# Patient Record
Sex: FEMALE | Race: WHITE | Employment: OTHER | URBAN - METROPOLITAN AREA
[De-identification: names, ages, dates, MRNs, and addresses within clinical notes are randomized per-mention and may not be internally consistent; named-entity substitution may affect disease eponyms.]

---

## 2017-11-07 ENCOUNTER — HOSPITAL ENCOUNTER (OUTPATIENT)
Dept: WOMENS IMAGING | Age: 67
Discharge: HOME | End: 2017-11-07
Attending: GENERAL PRACTICE | Admitting: PHYSICIAN ASSISTANT

## 2017-11-07 DIAGNOSIS — Z78.0 POSTMENOPAUSAL: ICD-10-CM

## 2017-11-07 DIAGNOSIS — Z12.31 VISIT FOR SCREENING MAMMOGRAM: ICD-10-CM

## 2017-11-15 ENCOUNTER — HOSPITAL ENCOUNTER (OUTPATIENT)
Dept: ULTRASOUND IMAGING | Age: 67
Discharge: HOME | End: 2017-11-15
Attending: PHYSICIAN ASSISTANT | Admitting: PHYSICIAN ASSISTANT

## 2017-11-15 DIAGNOSIS — R92.8 ABNORMAL MAMMOGRAM: ICD-10-CM

## 2018-03-19 ENCOUNTER — HOSPITAL ENCOUNTER (OUTPATIENT)
Dept: GENERAL RADIOLOGY | Age: 68
Discharge: HOME | End: 2018-03-19
Attending: INTERNAL MEDICINE | Admitting: INTERNAL MEDICINE

## 2018-03-19 DIAGNOSIS — M25.512 CHRONIC LEFT SHOULDER PAIN: ICD-10-CM

## 2018-03-19 DIAGNOSIS — G89.29 CHRONIC LEFT SHOULDER PAIN: ICD-10-CM

## 2018-04-02 ENCOUNTER — HOSPITAL ENCOUNTER (OUTPATIENT)
Dept: PHYSICAL THERAPY | Age: 68
Discharge: HOME | End: 2018-04-30
Attending: INTERNAL MEDICINE | Admitting: INTERNAL MEDICINE

## 2018-04-02 ASSESSMENT — PAIN DESCRIPTION - ONSET: ONSET: GRADUAL

## 2018-04-02 ASSESSMENT — PAIN DESCRIPTION - LOCATION: LOCATION: SHOULDER

## 2018-04-02 ASSESSMENT — PAIN DESCRIPTION - ORIENTATION: ORIENTATION: LEFT;POSTERIOR

## 2018-04-02 ASSESSMENT — PAIN DESCRIPTION - PAIN TYPE: TYPE: CHRONIC PAIN

## 2018-04-02 ASSESSMENT — PAIN SCALES - GENERAL: PAINLEVEL_OUTOF10: 0

## 2018-04-02 ASSESSMENT — PAIN DESCRIPTION - DIRECTION: RADIATING_TOWARDS: DENIES

## 2018-04-02 ASSESSMENT — PAIN DESCRIPTION - FREQUENCY: FREQUENCY: INTERMITTENT

## 2018-04-02 ASSESSMENT — PAIN DESCRIPTION - PROGRESSION: CLINICAL_PROGRESSION: NOT CHANGED

## 2018-04-02 ASSESSMENT — PAIN DESCRIPTION - DESCRIPTORS: DESCRIPTORS: SHOOTING;ACHING

## 2018-04-06 ENCOUNTER — HOSPITAL ENCOUNTER (OUTPATIENT)
Dept: PHYSICAL THERAPY | Age: 68
Discharge: HOME | End: 2018-04-06
Admitting: INTERNAL MEDICINE

## 2018-04-09 ENCOUNTER — HOSPITAL ENCOUNTER (OUTPATIENT)
Dept: PHYSICAL THERAPY | Age: 68
Discharge: HOME | End: 2018-04-09
Admitting: INTERNAL MEDICINE

## 2018-04-12 ENCOUNTER — HOSPITAL ENCOUNTER (OUTPATIENT)
Dept: PHYSICAL THERAPY | Age: 68
Discharge: HOME | End: 2018-04-12
Admitting: INTERNAL MEDICINE

## 2018-04-16 ENCOUNTER — HOSPITAL ENCOUNTER (OUTPATIENT)
Dept: PHYSICAL THERAPY | Age: 68
Discharge: HOME | End: 2018-04-16
Admitting: INTERNAL MEDICINE

## 2018-04-19 ENCOUNTER — HOSPITAL ENCOUNTER (OUTPATIENT)
Dept: PHYSICAL THERAPY | Age: 68
Discharge: HOME | End: 2018-04-19
Admitting: INTERNAL MEDICINE

## 2018-04-23 ENCOUNTER — HOSPITAL ENCOUNTER (OUTPATIENT)
Dept: PHYSICAL THERAPY | Age: 68
Discharge: HOME | End: 2018-04-23
Admitting: INTERNAL MEDICINE

## 2018-04-30 ENCOUNTER — HOSPITAL ENCOUNTER (OUTPATIENT)
Dept: PHYSICAL THERAPY | Age: 68
Discharge: HOME | End: 2018-04-30
Admitting: INTERNAL MEDICINE

## 2018-05-01 ENCOUNTER — HOSPITAL ENCOUNTER (OUTPATIENT)
Dept: OTHER | Age: 68
Discharge: HOME | End: 2018-05-31
Attending: INTERNAL MEDICINE | Admitting: INTERNAL MEDICINE

## 2018-05-03 ENCOUNTER — HOSPITAL ENCOUNTER (OUTPATIENT)
Dept: PHYSICAL THERAPY | Age: 68
Discharge: HOME | End: 2018-05-03
Admitting: INTERNAL MEDICINE

## 2018-05-18 ENCOUNTER — HOSPITAL ENCOUNTER (OUTPATIENT)
Dept: MRI IMAGING | Age: 68
Discharge: HOME | End: 2018-05-18
Attending: INTERNAL MEDICINE | Admitting: INTERNAL MEDICINE

## 2018-05-18 DIAGNOSIS — M25.512 LEFT SHOULDER PAIN, UNSPECIFIED CHRONICITY: ICD-10-CM

## 2018-06-01 ENCOUNTER — HOSPITAL ENCOUNTER (OUTPATIENT)
Dept: OTHER | Age: 68
Discharge: HOME | End: 2018-06-13
Attending: INTERNAL MEDICINE | Admitting: INTERNAL MEDICINE

## 2018-06-13 ENCOUNTER — HOSPITAL ENCOUNTER (OUTPATIENT)
Dept: PHYSICAL THERAPY | Age: 68
Discharge: HOME | End: 2018-06-30
Attending: ORTHOPAEDIC SURGERY | Admitting: ORTHOPAEDIC SURGERY

## 2018-06-13 ASSESSMENT — PAIN DESCRIPTION - DESCRIPTORS: DESCRIPTORS: ACHING

## 2018-06-13 ASSESSMENT — PAIN DESCRIPTION - FREQUENCY: FREQUENCY: INTERMITTENT

## 2018-06-13 ASSESSMENT — ACTIVITIES OF DAILY LIVING (ADL): EFFECT OF PAIN ON DAILY ACTIVITIES: SEE ABOVE

## 2018-06-13 ASSESSMENT — PAIN DESCRIPTION - ONSET: ONSET: GRADUAL

## 2018-06-13 ASSESSMENT — PAIN DESCRIPTION - ORIENTATION: ORIENTATION: LEFT;POSTERIOR

## 2018-06-13 ASSESSMENT — PAIN SCALES - GENERAL: PAINLEVEL_OUTOF10: 5

## 2018-06-13 ASSESSMENT — PAIN DESCRIPTION - PAIN TYPE: TYPE: CHRONIC PAIN

## 2018-06-13 ASSESSMENT — PAIN DESCRIPTION - LOCATION: LOCATION: SHOULDER

## 2018-06-13 NOTE — FLOWSHEET NOTE
Physical Therapy Daily Treatment Note  Date:  2018    Patient Name:  Lora Briscoe    :  1950  MRN: [id number]    Restrictions:  No formal restrictions  Diagnosis: Chronic L Shoulder Pain, bursitis, impingement syndrome  Treatment Diagnosis: L UE pain, stiffness, weakness  PT Insurance Information: Medicare  -  G-Coded  Referring Practitioner: Francia Bales  POC Signed: pending  POC Date Range:  18 - 18  Progress Note Due:  Every 10th visit w/goals reviewed  Visit# / total visits:                    G-Code Selection: (On Eval and every 10th visit or Discharge)  Evaluation completed 18    MEASURE    [] Mobility: Walking and Moving Around     [] Current ()   [] Goal ()   [] DC ()  [] Changing/Maintaining Body Position     [] Current (8981)      [] Goal ()   [] DC ()  [x] Carrying / Moving / Handling Objects     [x] Current ()   [x] Goal ()   [] DC ()  [] Self-Care     [] Current ()   [] Goal ()   [] DC ()  [] Other PT/OT primary DX     [] Current ()   [] Goal ()   [] DC ()    SEVERITY    CURRENT  GOAL  DISCHARGE   [] CH (0% Impaired, Indep.)  [x] CI (1-19% Impaired, SBA-CGA)  [] CJ (20-39% Impaired, MIN A)  [] CK  (40-59% Impairment, Mod A)  [] CL  (60-79% Impairment, Max A)  [] CM  (80-99% Impairment, Dep.)   [] CN  (100% Impairment, Tot Dep.) [x] CH (0% Impaired, Indep.)  [] CI (1-19% Impaired, SBA-CGA)  [] CJ (20-39% Impaired, MIN A)  [] CK  (40-59% Impairment, Mod A)  [] CL  (60-79% Impairment, Max A)  [] CM  (80-99% Impairment, Dep.)   [] CN  (100% Impairment, Tot Dep.)  [] CH (0% Impaired, Indep.)  [] CI (1-19% Impaired, SBA-CGA)  [] CJ (20-39% Impaired, MIN A)  [] CK  (40-59% Impairment, Mod A)  [] CL  (60-79% Impairment, Max A)  [] CM  (80-99% Impairment, Dep.)   [] CN  (100% Impairment, Tot Dep.)            Initial Pain level: 5 /10 left shoulder     Subjective:  See initial evaluation.     Any Manual Therapy               [x] Self care home management                    (  x  ) Dry needling    Post Tx Pain Rating:    Did not rate    Plan:  (Fequency/duration/# of visits)   [x] Continue per plan of care [] Alter current plan   [x] Plan of care initiated [] Hold pending MD visit [] Discharge    Time In: 7345  Time Out:1020  Timed Code Treatment Minutes:  55  Total Treatment Minutes:  75    Electronically signed by:  Oma Garcia, ATTILA 6/13/2018, 11:13 AM

## 2018-06-13 NOTE — PROGRESS NOTES
Physical Therapy  Initial Assessment  Date: 2018  Patient Name: Antelmo Valero  MRN: [id number]  : 1950     Treatment Diagnosis: L UE pain, stiffness, weakness    Restrictions  Restrictions/Precautions  Restrictions/Precautions:  (No formal restrictions)    Subjective   General  Chart Reviewed: Yes  Patient assessed for rehabilitation services?: Yes  Additional Pertinent Hx: PMH:  Osteoporosis, hx lumbar spine injury (from youth) w/spondylolisthesis, kidney stones, heart cath d/t chest pain, breast biopsies  Family / Caregiver Present: No  Referring Practitioner: Katie Kirk  Diagnosis: Chronic L Shoulder Pain   Follows Commands: Within Functional Limits  PT Visit Information  Onset Date:  (2018; fell at the last two steps into a bush but not sure if that was mechanism of injury. )  PT Insurance Information: Medicare  Subjective  Subjective: Since  patient has had worsening left shoulder pain. Pt did report a fall in January going down the last two steps at her sister's house and fell onto left hip and into some bushes. Her whole left side (knee, hip, shoulder) were sore. Pt has had physical therapy w/some benefit and cortizone injection has provided relief. Pt is still having difficulty taking clothing off, adjusting seat belt, reaching behind her back.   MRI on 18 revealed left shoulder bursa tearing, injury/tendinopathy of supraspinatua/infraspinatus, labral  degeneration and AC joint degeneration  Pain Screening  Patient Currently in Pain: Yes  Pain Assessment  Pain Assessment: 0-10  Pain Level: 5 (only w/certain movements)  Pain Type: Chronic pain  Pain Location: Shoulder  Pain Orientation: Left;Posterior  Pain Radiating Towards: down into lateral deltoid and back  Pain Descriptors: Aching  Pain Frequency: Intermittent  Pain Onset: Gradual  Effect of Pain on Daily Activities: see above  Vital Signs  Patient Currently in Pain: Yes    Vision/Hearing  Vision  Vision: handling objects  Carrying, Moving and Handling Objects Current Status (): At least 1 percent but less than 20 percent impaired, limited or restricted  Carrying, Moving and Handling Objects Goal Status (): 0 percent impaired, limited or restricted    OutComes Score                                           Goals  Long term goals  Time Frame for Long term goals : In 6 weeks, by 7/25/18, patient will  Long term goal 1: demonstrate compliance and independence w/HEP  Long term goal 2: demonstate full AROM shoulder ROM as needed for adls/leisure/homemaking activities. Long term goal 3: score 0% disability on Quick DASH as indication of significant functional improvement. Long term goal 4: demonstrate >= 4+/5 LUE strength for improved reaching, lifting function.        Therapy Time   Individual Concurrent Group Co-treatment   Time In 0905         Time Out 1020         Minutes 75         Timed Code Treatment Minutes: [place] [address], PT

## 2018-06-25 ENCOUNTER — HOSPITAL ENCOUNTER (OUTPATIENT)
Dept: PHYSICAL THERAPY | Age: 68
Discharge: HOME | End: 2018-06-25
Admitting: ORTHOPAEDIC SURGERY

## 2018-06-27 ENCOUNTER — HOSPITAL ENCOUNTER (OUTPATIENT)
Dept: PHYSICAL THERAPY | Age: 68
Discharge: HOME | End: 2018-06-27
Admitting: ORTHOPAEDIC SURGERY

## 2018-07-01 ENCOUNTER — HOSPITAL ENCOUNTER (OUTPATIENT)
Dept: OTHER | Age: 68
Discharge: HOME | End: 2018-07-31
Attending: ORTHOPAEDIC SURGERY | Admitting: ORTHOPAEDIC SURGERY

## 2018-07-10 ENCOUNTER — HOSPITAL ENCOUNTER (OUTPATIENT)
Dept: PHYSICAL THERAPY | Age: 68
Discharge: HOME | End: 2018-07-10
Admitting: ORTHOPAEDIC SURGERY

## 2018-07-10 NOTE — FLOWSHEET NOTE
functional outcome. Objective   findings: See initial eval AROM Supine IR 78°  ER 75°  abd 123°  Flex 156° AROM Supine IR 68° (shoulder abd 80 deg)  ER 78° (shoulder abd 80 deg)  abd 128°  Flex 164* AAROM  Left shoulder abd in sitting = 120 deg    TTP left supraspinatus, infraspinatus insertions, UT, teres minor   aarom  Supine scaption 140 deg w/pain EOR    Sitting FF  arom  148 deg    TTP left supraspinatus, infraspinatus insertions, UT, teres minor    Resistance felt left shoulder w/PROM shoulder ROM; not resisting/no pain. Response to intervention: Debra well. No increased pain Increase in ROM No increased pain  No increased pain   Overall change since Evaluation: N/A Reports improvement Continued improved ROM; pain remains at [facility] joint w/overhead movements Continued improved ROM; pain remains at [facility] joint w/overhead movements Slight improvement w/reaching overhead and reaching behind back   Plan for Next Session: Monitor response to DNT.   PREs left shoulder  Joint mobs scap mobs  GH mobs  biodex UBE  PREs w/focus on ER left shoulder   avoid impingement scap mobs  GH mobs  biodex UBE  PREs w/focus on ER left shoulder   avoid impingement scap mobs  GH mobs  biodex UBE  PREs w/focus on ER left shoulder   avoid impingement scap mobs  GH mobs  biodex UBE  PREs w/focus on ER left shoulder   avoid impingement     Intervention/modality used today:  [x] Therapeutic Exercise  [] Modalities:  [] Therapeutic Activity     [] Ultrasound  [] Elec  Stim  [] Gait Training      [] Cervical Traction [] Lumbar Traction  [x] Neuromuscular Re-education    [] Cold/hotpack [] Iontophoresis   [] Instruction in HEP      [] Vasopneumatic     [x] Manual Therapy               [] Self care home management                    (  ) Dry needling    Post Tx Pain Rating:   No increased pain    Plan:  (Fequency/duration/# of visits)   [x] Continue per plan of care [] Alter current plan   [] Plan of care initiated [] Hold pending MD visit [] Discharge    Time In: 1302   Time Out: 1313  Time In:  1314  Time Out:  1349  Timed Code Treatment Minutes: 11 + 35  Total Treatment Minutes: 46    Electronically signed by:  Autumn Molina, PTA 7/10/2018, 1:02 PM   Shelton Urias, ATTILA 7/10/18

## 2018-07-23 ENCOUNTER — HOSPITAL ENCOUNTER (OUTPATIENT)
Dept: PHYSICAL THERAPY | Age: 68
Discharge: HOME | End: 2018-07-23
Admitting: ORTHOPAEDIC SURGERY

## 2018-07-23 NOTE — FLOWSHEET NOTE
Physical Therapy Daily Treatment Note  Date:  2018    Patient Name:  Lev Sewell    :  1950  MRN: [id number]    Restrictions:  No formal restrictions  Diagnosis: Chronic L Shoulder Pain, bursitis, impingement syndrome  Treatment Diagnosis: L UE pain, stiffness, weakness  PT Insurance Information: Medicare  -  G-Coded  Referring Practitioner: Corie Alcantara  POC Signed: signed  POC Date Range:  18 - 18  Progress Note Due                    G-Code Selection: (On Eval and every 10th visit or Discharge)  Evaluation completed 18    MEASURE    [] Mobility: Walking and Moving Around     [] Current ()   [] Goal ()   [] DC ()  [] Changing/Maintaining Body Position     [] Current (8981)      [] Goal ()   [] DC ()  [x] Carrying / Moving / Handling Objects     [x] Current ()   [x] Goal ()   [] DC ()  [] Self-Care     [] Current ()   [] Goal ()   [] DC ()  [] Other PT/OT primary DX     [] Current ()   [] Goal ()   [] DC ()    SEVERITY    CURRENT  GOAL  DISCHARGE   [] CH (0% Impaired, Indep.)  [x] CI (1-19% Impaired, SBA-CGA)  [] CJ (20-39% Impaired, MIN A)  [] CK  (40-59% Impairment, Mod A)  [] CL  (60-79% Impairment, Max A)  [] CM  (80-99% Impairment, Dep.)   [] CN  (100% Impairment, Tot Dep.) [x] CH (0% Impaired, Indep.)  [] CI (1-19% Impaired, SBA-CGA)  [] CJ (20-39% Impaired, MIN A)  [] CK  (40-59% Impairment, Mod A)  [] CL  (60-79% Impairment, Max A)  [] CM  (80-99% Impairment, Dep.)   [] CN  (100% Impairment, Tot Dep.)  [] CH (0% Impaired, Indep.)  [] CI (1-19% Impaired, SBA-CGA)  [] CJ (20-39% Impaired, MIN A)  [] CK  (40-59% Impairment, Mod A)  [] CL  (60-79% Impairment, Max A)  [] CM  (80-99% Impairment, Dep.)   [] CN  (100% Impairment, Tot Dep.)      2018 MRI left shoulder:  1.  Less than 50% partial-thickness bursal surface tearing of the anterior and   mid insertional fibers of supraspinatus near the Re-education:  VC/TC/Demo for proper technique and TC for muscle recruitment given to ensure maximum therapeutic benefit and functional outcome. Neuromuscular Re-education:  VC/TC/Demo for proper technique and TC for muscle recruitment given to ensure maximum therapeutic benefit and functional outcome. Neuromuscular Re-education:  VC/TC/Demo for proper technique and TC for muscle recruitment given to ensure maximum therapeutic benefit and functional outcome. Verbal and manual cueing on proper performance of the prescribed exercise or of the designated task   Objective   findings: See initial eval AROM Supine IR 78°  ER 75°  abd 123°  Flex 156° AROM Supine IR 68° (shoulder abd 80 deg)  ER 78° (shoulder abd 80 deg)  abd 128°  Flex 164* AAROM  Left shoulder abd in sitting = 120 deg    TTP left supraspinatus, infraspinatus insertions, UT, teres minor   aarom  Supine scaption 140 deg w/pain EOR    Sitting FF  arom  148 deg    TTP left supraspinatus, infraspinatus insertions, UT, teres minor    Resistance felt left shoulder w/PROM shoulder ROM; not resisting/no pain. Supine flex 173*   Response to intervention: Debra well. No increased pain Increase in ROM No increased pain  No increased pain No increase in pain with exs   Overall change since Evaluation: N/A Reports improvement Continued improved ROM; pain remains at [facility] joint w/overhead movements Continued improved ROM; pain remains at [facility] joint w/overhead movements Slight improvement w/reaching overhead and reaching behind back Improved ROM and ability to reach seatblet   Plan for Next Session: Monitor response to DNT.   PREs left shoulder  Joint mobs scap mobs  GH mobs  biodex UBE  PREs w/focus on ER left shoulder   avoid impingement scap mobs  GH mobs  biodex UBE  PREs w/focus on ER left shoulder   avoid impingement scap mobs  GH mobs  biodex UBE  PREs w/focus on ER left shoulder   avoid impingement scap mobs  GH mobs  biodex UBE  PREs w/focus on ER left shoulder

## 2018-07-25 ENCOUNTER — HOSPITAL ENCOUNTER (OUTPATIENT)
Dept: PHYSICAL THERAPY | Age: 68
Discharge: HOME | End: 2018-07-25
Admitting: ORTHOPAEDIC SURGERY

## 2018-07-25 NOTE — FLOWSHEET NOTE
Physical Therapy Daily Treatment Note  Date:  2018    Patient Name:  Cindy Hurt    :  1950  MRN: [id number]    Restrictions:  No formal restrictions  Diagnosis: Chronic L Shoulder Pain, bursitis, impingement syndrome  Treatment Diagnosis: L UE pain, stiffness, weakness  PT Insurance Information: Medicare  -  G-Coded  G-Coded on 18  Referring Practitioner: Althea Kenney  POC Signed: signed  POC Date Range:  18 - 18  Progress Note Due 10 / 12                   G-Code Selection: (On Eval and every 10th visit or Discharge)  Evaluation completed 18    MEASURE    [] Mobility: Walking and Moving Around     [] Current ()   [] Goal ()   [] DC ()  [] Changing/Maintaining Body Position     [] Current (8981)      [] Goal ()   [] DC ()  [x] Carrying / Moving / Handling Objects     [x] Current ()   [x] Goal ()   [] DC ()  [] Self-Care     [] Current ()   [] Goal ()   [] DC ()  [] Other PT/OT primary DX     [] Current ()   [] Goal ()   [] DC ()    SEVERITY    CURRENT  GOAL  DISCHARGE   [] CH (0% Impaired, Indep.)  [x] CI (1-19% Impaired, SBA-CGA)  [] CJ (20-39% Impaired, MIN A)  [] CK  (40-59% Impairment, Mod A)  [] CL  (60-79% Impairment, Max A)  [] CM  (80-99% Impairment, Dep.)   [] CN  (100% Impairment, Tot Dep.) [x] CH (0% Impaired, Indep.)  [] CI (1-19% Impaired, SBA-CGA)  [] CJ (20-39% Impaired, MIN A)  [] CK  (40-59% Impairment, Mod A)  [] CL  (60-79% Impairment, Max A)  [] CM  (80-99% Impairment, Dep.)   [] CN  (100% Impairment, Tot Dep.)  [] CH (0% Impaired, Indep.)  [] CI (1-19% Impaired, SBA-CGA)  [] CJ (20-39% Impaired, MIN A)  [] CK  (40-59% Impairment, Mod A)  [] CL  (60-79% Impairment, Max A)  [] CM  (80-99% Impairment, Dep.)   [] CN  (100% Impairment, Tot Dep.)      2018 MRI left shoulder:  1.  Less than 50% partial-thickness bursal surface tearing of the anterior and   mid insertional fibers of supraspinatus near the footplate.  Moderate   underlying supraspinatus tendinopathy. 2. Mild infraspinatus tendinopathy. 3. Mild diffuse labral degeneration.  Normal variant sublabral sulcus. 4. Mild degenerative changes of the left AC joint. Initial Pain level: 0/10 actively using it, 0/10 at rest, worst 4/10 (avoids having it get too bad)     Subjective:  Easier to take shirt off. Still painful w/reaching LUE back behind her (such as to don seatbelt), she is now able to do that slowly. Pt saw an orthopedic surgeon last Thursday. She saw a female PA who said for her to finish out her PT. Said \". .at her age. [facility] \" she may never get back full range. Any changes to Ambulatory Summary Sheet? No  Any major status changes? No             Goals:     Long term goals  Time Frame for Long term goals : In 6 weeks, by 7/25/18, patient will  Long term goal 1: demonstrate compliance and independence w/HEP  Long term goal 2: demonstate full AROM shoulder ROM as needed for adls/leisure/homemaking activities. Long term goal 3: score 0% disability on Quick DASH as indication of significant functional improvement. Long term goal 4: demonstrate >= 4+/5 LUE strength for improved reaching, lifting function. Skilled PT activities: Date 6/13/18  #1 Date 6/27/18   #5 Date 7/3/18  #6 Date 7/5/18  #7 7/10/18 #8 7/23/18 #9 7/25/18 #10   Outcome measure  QD = 17      QD=16    Home Management/Self-Care  DNT Acknowledgement form provided, reviewed and signed by patient. Patient wished to proceed w/DNT left shoulder. Pt informed to expect mild needle soreness x 1-2 days.                    Therex   Reviewed the following exercises:    Scapular retraction w/GTB    IR/ER  RTB    Standing  Shoulder scaption w/RTB    Shoulder abd w/wall walk using RTB    Shoulder ER w/shoulder abd using RTB    (above exercises provided by previous therapist and still remain appropriate.) UBE 3'/3'    cybex RD 15# 2 x 10    Side lye ER 1# 2 x 10 L only    Seated shldr abd 1# 2 x 10 B    Seated scaption  1# 2 x 10 B UBE 3'/3' w/shoulder flex approx 60 deg    cybex RD/ROWS 15#  Seat #1 2 x 10    Side lye ER  Towel under arm  1.5# 2 x 10 L only    Seated on lg yellow SB  shldr abd 1.5# 2 x 10 B    Seated on lg yellow SB scaption  1.5# 2 x 10 B cybex   Seat #1, arms #2  RD 10#  2 x 10  ROWS 15#  Seat #1 2 x 15    Seated on lg yellow SB  D2 flexion/ext  LUE AAROM  2 x 10    Seated on lg yellow SB  shldr abd 1.5# 2 x 10 B    Seated on lg yellow SB scaption  1.5# 2 x 10 B   UBE 4'/4'    LUE ER full arom  2# hand wt  Right side lying  3 x 10    Seated on lg yellow SB  D2 flexion/ext  LUE Arom  1.5# wrist wt  2 x 10    Seated on lg yellow SB  shldr abd 1.5# 2 x 10 B    Seated on lg yellow SB scaption  1.5# 2 x 10 B     UBE 4'/4'    LUE ER full arom  2# hand wt  Right side lying  3 x 10    Seated on lg yellow SB  D2 flexion/ext  LUE Arom  1.5# wrist wt  3 x 10    Seated on lg yellow SB  shldr abd 1.5# 2 x 10 B    Seated on lg yellow SB scaption  1.5# 3 x 10 B UBE 4'/4'    LUE ER full AROM 2# hand wt R sidelying 3 x 10 reps    Seated on lg yellow SB D2 flex/ext L UE with 1.5# wrist wt 3 x 10 reps    Seated on lg yellow SB shldr abd 1.5# 2 x 10    Seated on lg yellow SB with 1.5# scaption 3 x 10 reps B    Cybex rows and RD 15# 2 x 10 reps       Manual therapy    Joint mobs , distractions and PROM in all planes x 15 min Joint mobs , distractions and PROM in all planes x 15 min Joint mobs , distractions and PROM in all planes   Subscapularis stretch  x 15 min Joint mobs , distractions and PROM in all planes   Subscapularis stretch  x 15 min Joint mobs , distractions and PROM in all planes   Subscapularis stretch  x 15 min Joint mobs , distractions and PROM in all planes   Subscapularis stretch  x 15 min   DNT   Right sidelying w/pillow b/t knees    Left supraspinatus/infraspinatus and teres minor  0.25 x 30 mm   Right side lying w/pillow b/t knees    Left supraspinatus/infraspinatus, teres minor, upper trap  0.25 x 30 mm                                                                                                                     HEP: See above cont Continue cane and T-band exercises continue Continue HEP cont Cont, blue TB and demo for rows   Supervision/Cues:  See above Verbal and manual cueing on proper performance of the prescribed exercise or of the designated task Neuromuscular Re-education:  VC/TC/Demo for proper technique and TC for muscle recruitment given to ensure maximum therapeutic benefit and functional outcome. Neuromuscular Re-education:  VC/TC/Demo for proper technique and TC for muscle recruitment given to ensure maximum therapeutic benefit and functional outcome. Neuromuscular Re-education:  VC/TC/Demo for proper technique and TC for muscle recruitment given to ensure maximum therapeutic benefit and functional outcome. Verbal and manual cueing on proper performance of the prescribed exercise or of the designated task Verbal and manual cueing on proper performance of the prescribed exercise or of the designated task   Objective   findings: See initial eval AROM Supine IR 78°  ER 75°  abd 123°  Flex 156° AROM Supine IR 68° (shoulder abd 80 deg)  ER 78° (shoulder abd 80 deg)  abd 128°  Flex 164* AAROM  Left shoulder abd in sitting = 120 deg    TTP left supraspinatus, infraspinatus insertions, UT, teres minor   aarom  Supine scaption 140 deg w/pain EOR    Sitting FF  arom  148 deg    TTP left supraspinatus, infraspinatus insertions, UT, teres minor    Resistance felt left shoulder w/PROM shoulder ROM; not resisting/no pain. Supine flex 173* AROM  Flex sup 164*  Ext standing 38*  abd sup 174*  IR sup 70*  ER sup 74*   Response to intervention: Debra well.   No increased pain Increase in ROM No increased pain  No increased pain No increase in pain with exs No increase in pain   Overall change since Evaluation: N/A Reports improvement Continued

## 2018-07-25 NOTE — PLAN OF CARE
Outpatient Physical Therapy        [] Phone: [phone]   Fax: [phone]   Physician: Referring Practitioner: Francia Bales        From: Yusuf Arenas PT     Patient: Lora Briscoe                    : 1950  Diagnosis: Chronic L Shoulder Pain, impingement syndrome, bursitis left shoulder  Treatment Diagnosis: L UE pain, stiffness, weakness  Date: 2018    [x]  Plan of Care    Total Visits to date:  10     Cancels/No-shows to date:  0    Subjective: Pt reports that the shoulder is doing much better, not all the way back to the way it was prior to the onset of pain, but much better. Taking the shirt off is much easier. It is still painful with reaching L UE back behind her (such as to don seatbelt) - now able to do that slowly. Pt saw a PA at an orthopedic office last Thursday - said for her to finish out her PT, said \". ..at her age. [employer] \" she may never get back full range. Prominent Objective Findings:      QuickDASH: 11% disability    L Shoulder AROM: supine:   Flexion: 164 deg   ABD: 174 deg   IR: 70 deg   ER: 74 deg  L shoulder AROM: standing:   Extension: 38 deg     Assessment: Ms. Denilson Lai indicates and demonstrates progress towards goals since starting therapy. Pain, ROM, and function have improved, but max improvement has not yet been achieved. She is motivated to continue therapy through the 12 visits established in the original plan of care, and then wishes to transition to the [facility] thereafter.      Goal Status:  [] Achieved [x] Partially Achieved  [] Not Achieved     Planned Services:  [x] Therapeutic Exercise    [] Modalities:  [x] Therapeutic Activity     [x] Ultrasound  [x] Electric Stimulation  [] Gait Training      [] Cervical Traction    [] Lumbar Traction  [x] Neuromuscular Re-education  [x] Cold/hotpack [] Iontophoresis  [x] Instruction in HEP      Other:  [x] Manual Therapy       [x]  Vasopneumatic  [x] Self care management                           [x] Dry needling trigger

## 2018-08-01 ENCOUNTER — HOSPITAL ENCOUNTER (OUTPATIENT)
Dept: OTHER | Age: 68
Discharge: HOME | End: 2018-08-03
Attending: ORTHOPAEDIC SURGERY | Admitting: ORTHOPAEDIC SURGERY

## 2019-09-09 ENCOUNTER — APPOINTMENT (OUTPATIENT)
Dept: CT IMAGING | Age: 69
End: 2019-09-09
Payer: MEDICARE

## 2019-09-09 ENCOUNTER — HOSPITAL ENCOUNTER (EMERGENCY)
Age: 69
Discharge: HOME | End: 2019-09-09
Attending: EMERGENCY MEDICINE
Payer: MEDICARE

## 2019-09-09 VITALS
WEIGHT: 135 LBS | RESPIRATION RATE: 22 BRPM | HEIGHT: 67 IN | BODY MASS INDEX: 21.19 KG/M2 | HEART RATE: 75 BPM | OXYGEN SATURATION: 94 % | DIASTOLIC BLOOD PRESSURE: 86 MMHG | TEMPERATURE: 97.8 F | SYSTOLIC BLOOD PRESSURE: 175 MMHG

## 2019-09-09 DIAGNOSIS — R31.9 URINARY TRACT INFECTION WITH HEMATURIA, SITE UNSPECIFIED: ICD-10-CM

## 2019-09-09 DIAGNOSIS — N39.0 URINARY TRACT INFECTION WITH HEMATURIA, SITE UNSPECIFIED: ICD-10-CM

## 2019-09-09 DIAGNOSIS — N20.0 KIDNEY STONE: Primary | ICD-10-CM

## 2019-09-09 LAB
ALBUMIN SERPL-MCNC: 4.2 GM/DL (ref 3.4–5)
ALP BLD-CCNC: 62 IU/L (ref 40–128)
ALT SERPL-CCNC: 9 U/L (ref 10–40)
ANION GAP SERPL CALCULATED.3IONS-SCNC: 14 MMOL/L (ref 4–16)
AST SERPL-CCNC: 13 IU/L (ref 15–37)
BACTERIA: ABNORMAL /HPF
BASOPHILS ABSOLUTE: 0.1 K/CU MM
BASOPHILS RELATIVE PERCENT: 0.7 % (ref 0–1)
BILIRUB SERPL-MCNC: 0.2 MG/DL (ref 0–1)
BILIRUBIN URINE: NEGATIVE MG/DL
BLOOD, URINE: ABNORMAL
BUN BLDV-MCNC: 26 MG/DL (ref 6–23)
CALCIUM SERPL-MCNC: 9.3 MG/DL (ref 8.3–10.6)
CHLORIDE BLD-SCNC: 104 MMOL/L (ref 99–110)
CLARITY: ABNORMAL
CO2: 24 MMOL/L (ref 21–32)
COLOR: YELLOW
CREAT SERPL-MCNC: 0.7 MG/DL (ref 0.6–1.1)
DIFFERENTIAL TYPE: ABNORMAL
EOSINOPHILS ABSOLUTE: 0.2 K/CU MM
EOSINOPHILS RELATIVE PERCENT: 2 % (ref 0–3)
GFR AFRICAN AMERICAN: >60 ML/MIN/1.73M2
GFR NON-AFRICAN AMERICAN: >60 ML/MIN/1.73M2
GLUCOSE BLD-MCNC: 147 MG/DL (ref 70–99)
GLUCOSE, URINE: NEGATIVE MG/DL
HCT VFR BLD CALC: 41 % (ref 37–47)
HEMOGLOBIN: 13 GM/DL (ref 12.5–16)
IMMATURE NEUTROPHIL %: 0.3 % (ref 0–0.43)
KETONES, URINE: NEGATIVE MG/DL
LEUKOCYTE ESTERASE, URINE: ABNORMAL
LIPASE: 28 IU/L (ref 13–60)
LYMPHOCYTES ABSOLUTE: 2.2 K/CU MM
LYMPHOCYTES RELATIVE PERCENT: 19.5 % (ref 24–44)
MCH RBC QN AUTO: 29.7 PG (ref 27–31)
MCHC RBC AUTO-ENTMCNC: 31.7 % (ref 32–36)
MCV RBC AUTO: 93.8 FL (ref 78–100)
MONOCYTES ABSOLUTE: 0.6 K/CU MM
MONOCYTES RELATIVE PERCENT: 5.2 % (ref 0–4)
MUCUS: ABNORMAL HPF
NITRITE URINE, QUANTITATIVE: NEGATIVE
NON SQUAM EPI CELLS: 2 /HPF
NUCLEATED RBC %: 0 %
PDW BLD-RTO: 12.8 % (ref 11.7–14.9)
PH, URINE: 5 (ref 5–8)
PLATELET # BLD: 299 K/CU MM (ref 140–440)
PMV BLD AUTO: 10 FL (ref 7.5–11.1)
POTASSIUM SERPL-SCNC: 4 MMOL/L (ref 3.5–5.1)
PROTEIN UA: NEGATIVE MG/DL
RBC # BLD: 4.37 M/CU MM (ref 4.2–5.4)
RBC URINE: 171 /HPF (ref 0–6)
SEGMENTED NEUTROPHILS ABSOLUTE COUNT: 8.3 K/CU MM
SEGMENTED NEUTROPHILS RELATIVE PERCENT: 72.3 % (ref 36–66)
SODIUM BLD-SCNC: 142 MMOL/L (ref 135–145)
SPECIFIC GRAVITY UA: 1.01 (ref 1–1.03)
SQUAMOUS EPITHELIAL: 10 /HPF
TOTAL IMMATURE NEUTOROPHIL: 0.04 K/CU MM
TOTAL NUCLEATED RBC: 0 K/CU MM
TOTAL PROTEIN: 7.3 GM/DL (ref 6.4–8.2)
TRICHOMONAS: ABNORMAL /HPF
UROBILINOGEN, URINE: NORMAL MG/DL (ref 0.2–1)
WBC # BLD: 11.5 K/CU MM (ref 4–10.5)
WBC UA: 150 /HPF (ref 0–5)

## 2019-09-09 PROCEDURE — 96375 TX/PRO/DX INJ NEW DRUG ADDON: CPT

## 2019-09-09 PROCEDURE — 80053 COMPREHEN METABOLIC PANEL: CPT

## 2019-09-09 PROCEDURE — 96365 THER/PROPH/DIAG IV INF INIT: CPT

## 2019-09-09 PROCEDURE — 6360000002 HC RX W HCPCS: Performed by: EMERGENCY MEDICINE

## 2019-09-09 PROCEDURE — 87086 URINE CULTURE/COLONY COUNT: CPT

## 2019-09-09 PROCEDURE — 74176 CT ABD & PELVIS W/O CONTRAST: CPT

## 2019-09-09 PROCEDURE — 85025 COMPLETE CBC W/AUTO DIFF WBC: CPT

## 2019-09-09 PROCEDURE — 81001 URINALYSIS AUTO W/SCOPE: CPT

## 2019-09-09 PROCEDURE — 2580000003 HC RX 258: Performed by: EMERGENCY MEDICINE

## 2019-09-09 PROCEDURE — 83690 ASSAY OF LIPASE: CPT

## 2019-09-09 PROCEDURE — 99284 EMERGENCY DEPT VISIT MOD MDM: CPT

## 2019-09-09 RX ORDER — KETOROLAC TROMETHAMINE 30 MG/ML
15 INJECTION, SOLUTION INTRAMUSCULAR; INTRAVENOUS ONCE
Status: COMPLETED | OUTPATIENT
Start: 2019-09-09 | End: 2019-09-09

## 2019-09-09 RX ORDER — IBUPROFEN 600 MG/1
600 TABLET ORAL EVERY 8 HOURS PRN
Qty: 30 TABLET | Refills: 0 | Status: SHIPPED | OUTPATIENT
Start: 2019-09-09

## 2019-09-09 RX ORDER — MORPHINE SULFATE 4 MG/ML
4 INJECTION, SOLUTION INTRAMUSCULAR; INTRAVENOUS ONCE
Status: COMPLETED | OUTPATIENT
Start: 2019-09-09 | End: 2019-09-09

## 2019-09-09 RX ORDER — ONDANSETRON 2 MG/ML
4 INJECTION INTRAMUSCULAR; INTRAVENOUS ONCE
Status: COMPLETED | OUTPATIENT
Start: 2019-09-09 | End: 2019-09-09

## 2019-09-09 RX ORDER — TAMSULOSIN HYDROCHLORIDE 0.4 MG/1
0.4 CAPSULE ORAL DAILY
Qty: 10 CAPSULE | Refills: 0 | Status: SHIPPED | OUTPATIENT
Start: 2019-09-09 | End: 2022-03-09

## 2019-09-09 RX ORDER — 0.9 % SODIUM CHLORIDE 0.9 %
1000 INTRAVENOUS SOLUTION INTRAVENOUS ONCE
Status: COMPLETED | OUTPATIENT
Start: 2019-09-09 | End: 2019-09-09

## 2019-09-09 RX ORDER — CEFDINIR 300 MG/1
300 CAPSULE ORAL 2 TIMES DAILY
Qty: 20 CAPSULE | Refills: 0 | Status: SHIPPED | OUTPATIENT
Start: 2019-09-09 | End: 2019-09-19

## 2019-09-09 RX ORDER — HYDROCODONE BITARTRATE AND ACETAMINOPHEN 5; 325 MG/1; MG/1
1 TABLET ORAL EVERY 6 HOURS PRN
Qty: 10 TABLET | Refills: 0 | Status: SHIPPED | OUTPATIENT
Start: 2019-09-09 | End: 2019-09-12

## 2019-09-09 RX ADMIN — KETOROLAC TROMETHAMINE 15 MG: 30 INJECTION, SOLUTION INTRAMUSCULAR at 02:45

## 2019-09-09 RX ADMIN — ONDANSETRON 4 MG: 2 INJECTION INTRAMUSCULAR; INTRAVENOUS at 01:55

## 2019-09-09 RX ADMIN — CEFTRIAXONE SODIUM 1 G: 1 INJECTION, POWDER, FOR SOLUTION INTRAMUSCULAR; INTRAVENOUS at 02:03

## 2019-09-09 RX ADMIN — SODIUM CHLORIDE 1000 ML: 9 INJECTION, SOLUTION INTRAVENOUS at 02:02

## 2019-09-09 RX ADMIN — MORPHINE SULFATE 4 MG: 4 INJECTION, SOLUTION INTRAMUSCULAR; INTRAVENOUS at 01:55

## 2019-09-09 ASSESSMENT — PAIN SCALES - GENERAL
PAINLEVEL_OUTOF10: 10
PAINLEVEL_OUTOF10: 0

## 2019-09-09 ASSESSMENT — ENCOUNTER SYMPTOMS
SORE THROAT: 0
ABDOMINAL PAIN: 0
COLOR CHANGE: 0
COUGH: 0
SHORTNESS OF BREATH: 0

## 2019-09-09 ASSESSMENT — PAIN DESCRIPTION - PAIN TYPE: TYPE: ACUTE PAIN

## 2019-09-09 ASSESSMENT — PAIN DESCRIPTION - LOCATION: LOCATION: FLANK

## 2019-09-09 ASSESSMENT — PAIN DESCRIPTION - ORIENTATION: ORIENTATION: RIGHT

## 2019-09-10 LAB
CULTURE: ABNORMAL
Lab: ABNORMAL
SPECIMEN: ABNORMAL
TOTAL COLONY COUNT: ABNORMAL

## 2019-09-10 NOTE — ED PROVIDER NOTES
Dr Stephanie Porras pt    Pt called in to update  Dr Stephanie Porras that he is still experiencing belching when he lays down at night and the diarrhea is stable with the medication    [phone] reconstruction, and/or weight based adjustment of the mA/kV was utilized to reduce the radiation dose to as low as reasonably achievable. COMPARISON: 08/13/2015 CT HISTORY: ORDERING SYSTEM PROVIDED HISTORY: right flank pain, hematuria, hx of kidney stones TECHNOLOGIST PROVIDED HISTORY: Additional Contrast?->None Reason for Exam: abd pain Acuity: Acute Type of Exam: Initial Additional signs and symptoms: Patient to ED with complaints of right sided flank pain that began yesterday. Hx of kidney stones. Vomiting. Alert and oriented during triage. Relevant Medical/Surgical History: abd pain FINDINGS: Lower Chest:  Mild interstitial changes in the lung bases. The base of the heart is normal. Organs: 11 mm cyst in the right hepatic lobe. Gallbladder, biliary ducts, pancreas and spleen are normal.  The adrenal glands are normal.  Punctate 2 mm calculus in the proximal left ureter with mild hydronephrosis. 4 mm calculus at the right UVJ with moderate hydroureteronephrosis. GI/Bowel: The stomach, duodenum and small bowel are normal. A normal appendix is visualized. The colon is normal. Pelvis: The bladder is unremarkable. The uterus and adnexa are normal. Peritoneum/Retroperitoneum: The aorta tapers normally. No lymph node enlargement. Bones/Soft Tissues: Spondylolysis and spondylolisthesis at L5-S1.     1. 4 mm calculus at the right UVJ with moderate hydroureteronephrosis. 2. Punctate calculus in the proximal left ureter with mild hydronephrosis. 3. Spondylolysis and spondylolisthesis at L5-S1. MDM:  Patient had significant improvement in pain with morphine and in particular toradol. She appears comfortable on re-check. She is afebrile. Noted mild leukocytosis with UA concering for infection in addition to a 4mm UVJ stone with moderate hydronephrosis. She is afebrile. No tachycardia. Non-toxic apperance. Do not believe she is septic at this time. She has no hx of immunocompromised state. Creatinine wnl.  She was

## 2019-11-14 ENCOUNTER — HOSPITAL ENCOUNTER (OUTPATIENT)
Dept: WOMENS IMAGING | Age: 69
Discharge: HOME | End: 2019-11-14
Payer: MEDICARE

## 2019-11-14 DIAGNOSIS — Z78.0 POSTMENOPAUSAL: ICD-10-CM

## 2019-11-14 DIAGNOSIS — Z12.31 VISIT FOR SCREENING MAMMOGRAM: ICD-10-CM

## 2019-11-14 PROCEDURE — 77063 BREAST TOMOSYNTHESIS BI: CPT

## 2019-11-14 PROCEDURE — 77080 DXA BONE DENSITY AXIAL: CPT

## 2021-08-02 ENCOUNTER — HOSPITAL ENCOUNTER (OUTPATIENT)
Dept: SPEECH THERAPY | Age: 71
Setting detail: THERAPIES SERIES
Discharge: HOME | End: 2021-08-02
Payer: MEDICARE

## 2021-08-02 DIAGNOSIS — R49.0 FLACCID DYSPHONIA: Primary | ICD-10-CM

## 2021-08-02 PROCEDURE — 92524 BEHAVRAL QUALIT ANALYS VOICE: CPT | Performed by: SPEECH-LANGUAGE PATHOLOGIST

## 2021-08-02 NOTE — PROGRESS NOTES
She and her  report no concerns with articulation or voice quality and indicated that reduced loudness is the only limiting factor in communication. Pt's spouse Rian Sibley is her primary communication partner as they are both retired. Other communication needs include [address] [place] [address] Blvd with her children, calling friend's on the phone, meeting friend's for coffee occasionally, , [employer] class, and exercise class with Parkinson's group at [facility]. Pt commented that she occasionally has difficulty thinking of words. Occupation: Retired     IADL Hx:  Independent. Hobbies include sculpting, [employer] work, reading [employer], and gardening. 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 Activities of Voice Use: everyday communication with spouse, friends and family, counting aloud for LSVT Big exercises    Significant Medical History:  Thyroid nodules, heart \"spasms\"   has a past medical history of Kidney stone. RESULTS: Lina Wilkerson presents with mild dysphonia characterized by mildly breathy quality, reduced vocal intensity and monopitch. Subjective:  Pt was alert, pleasant, and provides accurate medical history. She demonstrates decreased insight into her reduced vocal loudness c/w PD. Voice Handicap Index was administered this date which looks at the physical, functional and emotional aspects of voice from the perception of pt/family.    Pre-Tx   Post-Tx    Physical 16    Functional 6    Emotional 10    Total Voice Handicap Index Score 32    Areas most affecting patient includes:  Speaking with spouse    Perceptual Voice/Speech Characteristics:  Quality Mildly breathy   Resonance Normal   Onset Normal    Rate Mildly rapid connected   Tone Normal   Volume Reduced       Pitch Mod Monopitch    Variety Reduced     Objective:  Objective data was obtained acoustic measures of SPL (sound pressure level which is the acoustic correlate of vocal loudness) measured with a sound level meter at a distance of 40 cm from pt's mouth during voice and speech tasks and using a pitch meter to measure the pitch range. The following info was obtained   VALUE -  RESULTS NORMATIVE DATA Value Range Stimulability testing for LSVT   Sound Pressure level for:  Sustained vowels  Oral Reading  Conversation  64.87 dB   85-90 dB SPL 58-70          Frequency Range   Hz 2 Octaves same    Maximum Phonation Time (MPT) 12.5 sec 14.3 Female  15 Male  seconds     These results represent reading and conversational vocal SPL levels that may significantly reduce Joslyn Fuentes speech intelligibility and communicative effectiveness. Observations regarding affect, posture, jaw movement:  Affect is moderately flat, posture is normal, reduced mandible depression. Language/Cognition:  Language and cognition are judged informally to be [facility]. Speech/Oral Mechanism:  Oral structure and function [facility]. Overall speech intelligibility in conversation is judged to be 100 % intelligible. Breath Support:  Breath support is judged to be mildly reduced. Hearing:  Hearing is [facility]. 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 Timeframe for Goals: 4 weeks 4x/week (16 sessions) for 1 hour sessions for LSVT LOUD program to address dysarthria/dysphonia as a result of Parkinson's Disease. Prognosis for improvement is good based on early stage of disease, excellent motivation, and good stimulability pending clearance from ENT. GOALS:  LT Goal: Joslyn Fuentes will use strategies to improve vocal loudness and respiratory laryngeal COORD utilizing the LSVT protocol to communicate effectively in daily communication interactions with family and friends. Personal Goal:  To speak with adequate loudness in conversations with her children \"so they don't worry. \"    ST Goals: Pt will. .. Goal 1:  Increase vocal loudness to reach a target SPL of 75-80 dB SPL with min cues during sustained phonation, which will help increase vocal respiratory support required for fx communication. Goal 2: Increase vocal loudness to reach a target SPL of 75-80 dB SPL with min cues during reading at the word and sentence level, which will help increase vocal respiratory support required for fx communication  Goal 3: Increase vocal loudness to reach a target SPL of 75-80 dB SPL with min cues during simple structured conversational speech, which will help increase vocal respiratory support required for fx communication. Goal 4: Reach a target SPL of 70-75 dB for longer more complex paragraph level reading and complex conversational speech at 40 cm with min cues to increase loudness  Goal 5: Complete daily home exercise program (HEP) independently for sustained phonation tasks, pitch tasks, fx phrase tasks, and carryover tasks as listed with 100% compliance. G CODE via DMO NOMS    [] Voice     [] Current () [] Goal () [] DC ()    SEVERITY    CURRENT GOAL  DISCHARGE   [] CH (0% Impaired, NOMS 7)   [] CI (1-19% Impaired, NOMS 6)  [] CJ (20-39% Impaired, NOMS 5)  [] CK  (40-59% Impairment, NOMS 4)  [] CL  (60-79% Impairment, NOMS 3 )  [] CM  (80-99% Impairment, NOMS 2)   [] CN  (100% Impairment, NOMS 1) [] CH (0% Impaired, NOMS 7)  [] CI (1-19% Impaired, NOMS 6)  [] CJ (20-39% Impaired, NOMS 5)  [] CK  (40-59% Impairment, NOMS 4)  [] CL  (60-79% Impairment, NOMS 3 )  [] CM  (80-99% Impairment, NOMS 2)   [] CN  (100% Impairment, NOMS 1) [] CH (0% Impaired, NOMS 7)  [] CI (1-19% Impaired, NOMS 6)  [] CJ (20-39% Impaired, NOMS 5)  [] CK  (40-59% Impairment, NOMS 4)  [] CL  (60-79% Impairment, NOMS 3 )  [] CM  (80-99% Impairment, NOMS 2)   [] CN  (100% Impairment, NOMS 1)       Time In/ Out: 1100/1200  Total Evaluation Time: 61    Dear Dr. Fausto Gongora and/or Dr. Iraj Abbott:   Thank you for referring Aleida Oliver to the [facility] at [facility]. Please review this eval report and sign below to show your agreement with this plan of care. Fax to [phone]. Please contact me with questions or concerns at [address] [facility], [facility], 8/2/2021, 12:36 PM  Speech-Language Pathologist     Certification Signature:  _____________________________________  Date:_____________ TIME: ______________    The report requires certification signature from the referring physician and re-authorization every 10 sessions. Please sign and fax to [phone]. Thank you.

## 2021-08-31 ENCOUNTER — HOSPITAL ENCOUNTER (OUTPATIENT)
Dept: SPEECH THERAPY | Age: 71
Setting detail: THERAPIES SERIES
Discharge: HOME | End: 2021-08-31
Payer: MEDICARE

## 2021-08-31 PROCEDURE — 92507 TX SP LANG VOICE COMM INDIV: CPT | Performed by: SPEECH-LANGUAGE PATHOLOGIST

## 2021-09-01 ENCOUNTER — HOSPITAL ENCOUNTER (OUTPATIENT)
Dept: SPEECH THERAPY | Age: 71
Setting detail: THERAPIES SERIES
Discharge: HOME | End: 2021-09-01
Payer: MEDICARE

## 2021-09-01 PROCEDURE — 92507 TX SP LANG VOICE COMM INDIV: CPT | Performed by: SPEECH-LANGUAGE PATHOLOGIST

## 2021-09-01 NOTE — PROGRESS NOTES
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 Personal long term communication activity goal:  Increase vocal loudness for improved communication with spouse and others. Assessment:    [facility] was targeted this date. Session consisted of week 1 tasks for sustained phonation of /a/x 15, alteration of pitches from natural to high and natural to low with goal of sustained sound for 5 secs,  15 sets each; functional phrase drills of 10 common phrases x5 sets, hierarchical speech loudness drills at the  phrase level. Sound Pressure Meter distance from mouth:  40 cm    Goals:   Goal 1: Increase vocal loudness to reach a target SPL of 85 dB SPL with min cues during sustained phonation, which will help increase vocal respiratory support required for fx communication. Not Met, Continue  Goal 2: Increase vocal loudness to reach a target SPL of 80-85 dB SPL with min cues during reading at the word and sentence level, which will help increase vocal respiratory support required for fx communication Not Met, Continue  Goal 3: Increase vocal loudness to reach a target SPL of 75-80 dB SPL with min cues during simple structured conversational speech, which will help increase vocal respiratory support required for fx communication.  Not targeted   Goal 4: Reach a target SPL of 75 dB for longer more complex paragraph level reading and complex conversational speech at 40 cm with min cues to increase loudness Not targeted  Goal 5: Complete daily home exercise program (HEP) independently for sustained phonation tasks, pitch tasks, fx phrase tasks, and carryover tasks as listed with 100% compliance. New Goal    Date: 8/31/21    # of visits including eval 2      Subjective         Pain level: n/a    Sustained /a/ average secs   17.4 seconds    Sustained /a/ average dB SPL 78.4 dB    Range   70-82 dB    Highest Pitch Daily Avg 218 Hz    Lowest Pitch Daily Avg 144.8 Hz    Daily Avg of dB SPL in fx phrases DNT introduced    SPL during Reading  (level) words/phrases, sentences, paragraph, conversation Phrases  65.9 dB    Perceived level of effort    Cues for loudness Mod    SPL for off the cuff questions 58 dB    Sessions target very structured increased movement amplitude directed predominately to respiratory and laryngeal systems via a daily task focus of sustained, directional, and functional movements, hierarchy training in variable speaking activities, shaping techniques, and sensory calibration as listed above.     Intelligibility/Vocal Quality:  100% intelligibility, good for sustained phonation  Observations:  Reduced loudness for spontaneous output  Calibration Tasks: Between task comments, repetition  Homework Assignment: Protocol tasks with carryover at the phrase level  1 set    Carryover Assignment: use loud \"ah\" voice in the car with spouse  New Carryover for next session: continue    Adverse Reactions to treatment:  Education provided to patient/caregiver:    Treatment/Activity Tolerance:     [x]  Patient tolerated treatment well []  Patient limited by fatique    []  Patient limited by pain []  Patient limited by other medical complications   []  Other:     Time in/out:  10:30/11:30                                  Total Treatment Time:   60 minutes       1 unit speech/voice treatment     9/1/2021    Lizeth Downey Sol [address] 68 SLP  Speech Language Pathologist

## 2021-09-01 NOTE — PROGRESS NOTES
more complex paragraph level reading and complex conversational speech at 40 cm with min cues to increase loudness Not targeted  Goal 5: Complete daily home exercise program (HEP) independently for sustained phonation tasks, pitch tasks, fx phrase tasks, and carryover tasks as listed with 100% compliance. New Goal    Date: 8/31/21 9/1/21   # of visits including eval 2 3     Subjective     Reduced intensity of the cuff Reduced intensity off the cuff     Pain level: n/a n/a   Sustained /a/ average secs   17.4 seconds 17.8 sec   Sustained /a/ average dB SPL 78.4 dB 81.17 dB   Range   70-82 dB 77-85 dB   Highest Pitch Daily Avg 218 Hz 251 Jz   Lowest Pitch Daily Avg 144.8 Hz 158 Hz   Daily Avg of dB SPL in fx phrases DNT introduced 69.25 dB   SPL during Reading  (level) words/phrases, sentences, paragraph, conversation Phrases  65.9 dB Phrases  66.5 dB   Perceived level of effort    Cues for loudness Mod      Max Mod      Max   SPL for off the cuff questions 58 dB 58 dB   Sessions target very structured increased movement amplitude directed predominately to respiratory and laryngeal systems via a daily task focus of sustained, directional, and functional movements, hierarchy training in variable speaking activities, shaping techniques, and sensory calibration as listed above.     Intelligibility/Vocal Quality:  100% intelligibility, good for sustained phonation  Observations:  Reduced loudness for spontaneous output  Calibration Tasks: Between task comments, repetition  Homework Assignment: Protocol tasks with carryover at the phrase level  1 set    Carryover Assignment: use loud \"ah\" voice in the car with spouse  New Carryover for next session: continue    Adverse Reactions to treatment:  Education provided to patient/caregiver:    Treatment/Activity Tolerance:     [x]  Patient tolerated treatment well []  Patient limited by fatique    []  Patient limited by pain []  Patient limited by other medical complications   [] Other:      Time in/out:  10:30/11:30                                  Total Treatment Time:   60 minutes       1 unit speech/voice treatment     9/1/2021    Delia Camp MA [facility] SLP  Speech Language Pathologist

## 2021-09-08 ENCOUNTER — HOSPITAL ENCOUNTER (OUTPATIENT)
Dept: SPEECH THERAPY | Age: 71
Setting detail: THERAPIES SERIES
Discharge: HOME | End: 2021-09-08
Payer: MEDICARE

## 2021-09-08 PROCEDURE — 92507 TX SP LANG VOICE COMM INDIV: CPT | Performed by: SPEECH-LANGUAGE PATHOLOGIST

## 2021-09-08 NOTE — PROGRESS NOTES
complex paragraph level reading and complex conversational speech at 40 cm with min cues to increase loudness Not targeted  Goal 5: Complete daily home exercise program (HEP) independently for sustained phonation tasks, pitch tasks, fx phrase tasks, and carryover tasks as listed with 100% compliance. Meeting, Continue     Date: 8/31/21 9/1/21 9/8/21   # of visits including eval 2 3 4     Subjective     Reduced intensity of the cuff Reduced intensity off the cuff   Improving stimulability for spontaneous output     Pain level: n/a n/a n/a   Sustained /a/ average secs   17.4 seconds 17.8 sec 18.3 sec   Sustained /a/ average dB SPL 78.4 dB 81.17 dB 77.12 dB   Range   70-82 dB 77-85 dB 73-84 dB   Highest Pitch Daily Avg 218 Hz 251 Hz 229 Hz   Lowest Pitch Daily Avg 144.8 Hz 158 Hz 168 Hz   Daily Avg of dB SPL in fx phrases DNT introduced 69.25 dB 69.13 dB   SPL during Reading  (level) words/phrases, sentences, paragraph, conversation Phrases  65.9 dB Phrases  66.5 dB Sentences  65 dB    Paragraph  introduced   Perceived level of effort    Cues for loudness Mod      Max Mod      Max High      Mod   SPL for off the cuff questions 58 dB 58 dB 66 dB   Sessions target very structured increased movement amplitude directed predominately to respiratory and laryngeal systems via a daily task focus of sustained, directional, and functional movements, hierarchy training in variable speaking activities, shaping techniques, and sensory calibration as listed above.     Intelligibility/Vocal Quality:  100% intelligibility, good for sustained phonation  Observations:  Reduced loudness for spontaneous output  Calibration Tasks: Between task comments, repetition  Homework Assignment: Protocol tasks with carryover at the sentence/short paragraph  Carryover Assignment: talking in car - meeting,  cueing  New Carryover for next session: use loud \"ah\" voice for counting during LSVT Big    Adverse Reactions to treatment:  Education

## 2021-09-09 ENCOUNTER — HOSPITAL ENCOUNTER (OUTPATIENT)
Dept: SPEECH THERAPY | Age: 71
Setting detail: THERAPIES SERIES
Discharge: HOME | End: 2021-09-09
Payer: MEDICARE

## 2021-09-09 PROCEDURE — 92507 TX SP LANG VOICE COMM INDIV: CPT

## 2021-09-09 NOTE — PROGRESS NOTES
[facility]  Daily Speech Therapy LSVT Progress Note      Patient Name:  Pdama Powers    :  1950   MRN:  [id number]  Restrictions/Precautions:    Diagnosis: Parkinsons Disease  ICD 10-- G 20   Treatment Diagnosis: Hypokinetic Parkinsons Dysphonia Y64.8  Insurance/Certification information:    Referring Physician:    Plan of care signed (Y/N):   Communication with other providers:  n/a      Objective:  Long Term Goal: Padma Powers will use strategies to improve vocal loudness and respiratory laryngeal COORD utilizing the LSVT protocol to communicate effectively in daily communication interactions with family and friends. Personal long term communication activity goal:  Increase vocal loudness for improved communication with spouse and others. Assessment:    [facility] Therapy was targeted this date. Session consisted of week 1 tasks for sustained phonation of /a/x 15, alteration of pitches from natural to high and natural to low with goal of sustained sound for 5 secs,  15 sets each; functional phrase drills of 10 common phrases x5 sets, hierarchical speech loudness drills at the  phrase level. Sound Pressure Meter distance from mouth:  40 cm    Goals:   Goal 1: Increase vocal loudness to reach a target SPL of 85 dB SPL with min cues during sustained phonation, which will help increase vocal respiratory support required for fx communication. Not Met, Continue  Goal 2: Increase vocal loudness to reach a target SPL of 75-80 dB SPL with min cues during reading at the word and sentence level, which will help increase vocal respiratory support required for fx communication Changed goal   Goal 3: Increase vocal loudness to reach a target SPL of 75 dB SPL with min cues during simple structured conversational speech, which will help increase vocal respiratory support required for fx communication.  Changed goal  Goal 4: Reach a target SPL of 75 dB for longer more complex paragraph level reading and complex conversational speech at 40 cm with min cues to increase loudness Not targeted  Goal 5: Complete daily home exercise program (HEP) independently for sustained phonation tasks, pitch tasks, fx phrase tasks, and carryover tasks as listed with 100% compliance. Meeting, Continue     Date: 8/31/21 9/1/21 9/8/21 9/09/21    # of visits including eval 2 3 4 5      Subjective     Reduced intensity of the cuff Reduced intensity off the cuff   Improving stimulability for spontaneous output   Required moderate prompting to increase intensity w/ spontaneous speech     Pain level: n/a n/a n/a Denies     Sustained /a/ average secs   17.4 seconds 17.8 sec 18.3 sec 18.7 sec    Sustained /a/ average dB SPL 78.4 dB 81.17 dB 77.12 dB 76.84 dB    Range   70-82 dB 77-85 dB 73-84 dB 72-81 dB    Highest Pitch Daily Avg 218 Hz 251 Hz 229 Hz 215 Hz    Lowest Pitch Daily Avg 144.8 Hz 158 Hz 168 Hz 158 Hz    Daily Avg of dB SPL in fx phrases DNT introduced 69.25 dB 69.13 dB 68.24 dB    SPL during Reading  (level) words/phrases, sentences, paragraph, conversation Phrases  65.9 dB Phrases  66.5 dB Sentences  65 dB    Paragraph  introduced Sentences 65.5 dB      Paragraph  63.2 dB    Perceived level of effort    Cues for loudness Mod      Max Mod      Max High      Mod High      Mod    SPL for off the cuff questions 58 dB 58 dB 66 dB 62 dB    Sessions target very structured increased movement amplitude directed predominately to respiratory and laryngeal systems via a daily task focus of sustained, directional, and functional movements, hierarchy training in variable speaking activities, shaping techniques, and sensory calibration as listed above.     Intelligibility/Vocal Quality:  100% intelligibility, good for sustained phonation  Observations:  Reduced loudness for spontaneous output- Pt came from PT session with increased fatigue   Calibration Tasks: Between task comments, repetition  Homework Assignment: Protocol tasks with carryover at the sentence/short paragraph  Carryover Assignment: talking in car - meeting,  cueing  Latonia Wilhelm for next session: use loud \"ah\" voice for counting during LSVT Big    Adverse Reactions to treatment: None identified   Education provided to patient/caregiver: reviewed importance of carryover     Treatment/Activity Tolerance:     [x]  Patient tolerated treatment well []  Patient limited by fatique    []  Patient limited by pain []  Patient limited by other medical complications   []  Other:     Time in/out:  08:30-9:30                                 Total Treatment Time:   60 minutes       1 unit speech/voice treatment      Nicole Matt 89, [facility], 9/9/2021

## 2021-09-10 ENCOUNTER — HOSPITAL ENCOUNTER (OUTPATIENT)
Dept: SPEECH THERAPY | Age: 71
Setting detail: THERAPIES SERIES
Discharge: HOME | End: 2021-09-10
Payer: MEDICARE

## 2021-09-10 PROCEDURE — 92507 TX SP LANG VOICE COMM INDIV: CPT

## 2021-09-10 NOTE — PROGRESS NOTES
[facility]  Daily Speech Therapy LSVT Progress Note      Patient Name:  March Najjar    :  1950   MRN:  [id number]  Restrictions/Precautions:    Diagnosis: Parkinsons Disease  ICD 10-- G 20   Treatment Diagnosis: Hypokinetic Parkinsons Dysphonia C24.2  Insurance/Certification information:    Referring Physician:    Plan of care signed (Y/N):   Communication with other providers:  n/a      Objective:  Long Term Goal: March Najjar will use strategies to improve vocal loudness and respiratory laryngeal COORD utilizing the LSVT protocol to communicate effectively in daily communication interactions with family and friends. Personal long term communication activity goal:  Increase vocal loudness for improved communication with spouse and others. Assessment:    [facility] Voice Therapy was targeted this date. Session consisted of week 1 tasks for sustained phonation of /a/x 15, alteration of pitches from natural to high and natural to low with goal of sustained sound for 5 secs,  15 sets each; functional phrase drills of 10 common phrases x5 sets, hierarchical speech loudness drills at the  phrase level. Sound Pressure Meter distance from mouth:  40 cm    Goals:   Goal 1: Increase vocal loudness to reach a target SPL of 85 dB SPL with min cues during sustained phonation, which will help increase vocal respiratory support required for fx communication. Not Met, Continue  Goal 2: Increase vocal loudness to reach a target SPL of 75-80 dB SPL with min cues during reading at the word and sentence level, which will help increase vocal respiratory support required for fx communication Changed goal   Goal 3: Increase vocal loudness to reach a target SPL of 75 dB SPL with min cues during simple structured conversational speech, which will help increase vocal respiratory support required for fx communication.  Changed goal  Goal 4: Reach a target SPL of 75 dB for longer more Reduced loudness overall- pt reports having 3 exercises classes yesterday and was feeling fatigued   Calibration Tasks: Between task comments, repetition  Homework Assignment: Protocol tasks with carryover at the short paragraph  Carryover Assignment: talking in car - meeting,  cueing  New Carryover for next session: use loud \"ah\" voice for phone calls with her children     Adverse Reactions to treatment: None identified   Education provided to patient/caregiver: reviewed importance of carryover     Treatment/Activity Tolerance:     [x]  Patient tolerated treatment well []  Patient limited by fatique    []  Patient limited by pain []  Patient limited by other medical complications   []  Other:     Time in/out:  08:30-9:30                                 Total Treatment Time:   60 minutes       1 unit speech/voice treatment      Nicole oV Sriram 87, [id number] [facility], 9/10/2021

## 2021-09-16 ENCOUNTER — HOSPITAL ENCOUNTER (OUTPATIENT)
Dept: SPEECH THERAPY | Age: 71
Setting detail: THERAPIES SERIES
Discharge: HOME | End: 2021-09-16
Payer: MEDICARE

## 2021-09-16 PROCEDURE — 92507 TX SP LANG VOICE COMM INDIV: CPT

## 2021-09-16 NOTE — PROGRESS NOTES
more complex paragraph level reading and complex conversational speech at 40 cm with min cues to increase loudness Not targeted  Goal 5: Complete daily home exercise program (HEP) independently for sustained phonation tasks, pitch tasks, fx phrase tasks, and carryover tasks as listed with 100% compliance.  Meeting, Continue     Date: 8/31/21 9/1/21 9/8/21 9/09/21 9/10/21 9/16/21   # of visits including eval 2 3 4 5 6 7     Subjective     Reduced intensity of the cuff Reduced intensity off the cuff   Improving stimulability for spontaneous output   Required moderate prompting to increase intensity w/ spontaneous speech  Improved awareness and carryover for spontaneous speech  Reduced intensity off the cuff  Pt reports feeling fatigue after [facility] apt yesterday    Pain level: n/a n/a n/a Denies  Denies  Denies   Sustained /a/ average secs   17.4 seconds 17.8 sec 18.3 sec 18.7 sec 18.1 sec 17.8 sec   Sustained /a/ average dB SPL 78.4 dB 81.17 dB 77.12 dB 76.84 dB 76.6 dB 75.6 dB   Range   70-82 dB 77-85 dB 73-84 dB 72-81 dB 72-81 dB 68- 79 dB   Highest Pitch Daily Avg 218 Hz 251 Hz 229 Hz 215 Hz 224 Hz 233.8 Hz   Lowest Pitch Daily Avg 144.8 Hz 158 Hz 168 Hz 158 Hz 156 Hz 156 Hz   Daily Avg of dB SPL in fx phrases DNT introduced 69.25 dB 69.13 dB 68.24 dB 69.5 dB 69.1 dB   SPL during Reading  (level) words/phrases, sentences, paragraph, conversation Phrases  65.9 dB Phrases  66.5 dB Sentences  65 dB    Paragraph  introduced Sentences 65.5 dB      Paragraph  63.2 dB Paragraph 64.5 dB Paragraph 63.5 dB   Perceived level of effort    Cues for loudness Mod      Max Mod      Max High      Mod High      Mod Mod-High      Mod High      Max   SPL for off the cuff questions 58 dB 58 dB 66 dB 62 dB 59.8 dB 58 dB   Sessions target very structured increased movement amplitude directed predominately to respiratory and laryngeal systems via a daily task focus of sustained, directional, and functional movements, hierarchy training in variable speaking activities, shaping techniques, and sensory calibration as listed above. Intelligibility/Vocal Quality:  100% intelligibility, good for sustained phonation  Observations:  Reduced loudness overall- pt drove to [facility] for experimental drug yesterday, however the drug was not available.   Pt reports feeling stressed and fatigued   Calibration Tasks: Between task comments, repetition  Homework Assignment: Protocol tasks with carryover at the short paragraph  Carryover Assignment: Talking with children over the phone   New Carryover for next session: use loud \"ah\" voice for phone calls with her children     Adverse Reactions to treatment: None identified   Education provided to patient/caregiver: reviewed importance of carryover     Treatment/Activity Tolerance:     [x]  Patient tolerated treatment well [x]  Patient limited by fatique    []  Patient limited by pain []  Patient limited by other medical complications   []  Other:     Time in/out:  08:30-9:30                                 Total Treatment Time:   60 minutes       1 unit speech/voice treatment      Nicole Seay Sriram , [id number] [facility], 9/16/2021

## 2021-09-17 ENCOUNTER — HOSPITAL ENCOUNTER (OUTPATIENT)
Dept: SPEECH THERAPY | Age: 71
Setting detail: THERAPIES SERIES
Discharge: HOME | End: 2021-09-17
Payer: MEDICARE

## 2021-09-17 PROCEDURE — 92507 TX SP LANG VOICE COMM INDIV: CPT

## 2021-09-17 NOTE — PROGRESS NOTES
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 Personal long term communication activity goal:  Increase vocal loudness for improved communication with spouse and others. Assessment:    [facility] was targeted this date. Session consisted of week 1 tasks for sustained phonation of /a/x 15, alteration of pitches from natural to high and natural to low with goal of sustained sound for 5 secs,  15 sets each; functional phrase drills of 10 common phrases x5 sets, hierarchical speech loudness drills at the  pharagraph level. Sound Pressure Meter distance from mouth:  40 cm    Goals:   Goal 1: Increase vocal loudness to reach a target SPL of 85 dB SPL with min cues during sustained phonation, which will help increase vocal respiratory support required for fx communication. Not Met, Continue  Goal 2: Increase vocal loudness to reach a target SPL of 75-80 dB SPL with min cues during reading at the word and sentence level, which will help increase vocal respiratory support required for fx communication Changed goal   Goal 3: Increase vocal loudness to reach a target SPL of 75 dB SPL with min cues during simple structured conversational speech, which will help increase vocal respiratory support required for fx communication.  Changed goal  Goal 4: Reach a target SPL of 75 dB for longer more complex paragraph level reading and complex conversational speech at 40 cm with min cues to increase loudness Not targeted  Goal 5: Complete daily home exercise program (HEP) independently for sustained phonation tasks, pitch tasks, fx phrase tasks, and carryover tasks as listed with 100% compliance. Meeting, Continue     Date: 9/09/21 9/10/21 9/16/21 9/17/21    # of visits including eval 5 6 7 8      Subjective     Required moderate prompting to increase intensity w/ spontaneous speech  Improved awareness and carryover for spontaneous speech  Reduced intensity off the cuff  Pt reports feeling fatigue after [facility] apt yesterday  Improved carryover throughout session   Pt required reduced cueing     Pain level: Denies  Denies  Denies Denies    Sustained /a/ average secs   18.7 sec 18.1 sec 17.8 sec 20.2 sec    Sustained /a/ average dB SPL 76.84 dB 76.6 dB 75.6 dB 77.2 dB    Range   72-81 dB 72-81 dB 68- 79 dB 70- 81 dB    Highest Pitch Daily Avg 215 Hz 224 Hz 233.8 Hz 227 Hz    Lowest Pitch Daily Avg 158 Hz 156 Hz 156 Hz 149.6 Hz    Daily Avg of dB SPL in fx phrases 68.24 dB 69.5 dB 69.1 dB 68.8 dB    SPL during Reading  (level) words/phrases, sentences, paragraph, conversation Sentences 65.5 dB      Paragraph  63.2 dB Paragraph 64.5 dB Paragraph 63.5 dB Paragraph 64.1 dB      Conversation (introduced with max cueing)     Perceived level of effort    Cues for loudness High      Mod Mod-High      Mod High      Max High      Mod    SPL for off the cuff questions 62 dB 59.8 dB 58 dB 59.7 dB    Sessions target very structured increased movement amplitude directed predominately to respiratory and laryngeal systems via a daily task focus of sustained, directional, and functional movements, hierarchy training in variable speaking activities, shaping techniques, and sensory calibration as listed above.     Intelligibility/Vocal Quality:  100% intelligibility, good for sustained phonation  Observations:  Improvement from yesterday's session, however pt continues to report fatigue from a busy week   Calibration Tasks: Between task comments, repetition  Homework Assignment: Protocol tasks with carryover at the paragraph/ reading passage  Carryover Assignment: Talking with children over the phone    New Carryover for next session: use loud \"ah\" voice to greet son at the airport and during conversation     Adverse Reactions to treatment: None identified   Education provided to patient/caregiver: reviewed importance of carryover     Treatment/Activity Tolerance:     [x]  Patient tolerated treatment well [x]  Patient limited by fatique    []  Patient limited by pain []  Patient limited by other medical complications   []  Other:     Time in/out:  08:30-9:30                                 Total Treatment Time:   60 minutes       1 unit speech/voice treatment      John Edmonds, Nicole Sriram 89, Madhu Priest, 9/17/2021

## 2021-09-20 ENCOUNTER — HOSPITAL ENCOUNTER (OUTPATIENT)
Dept: SPEECH THERAPY | Age: 71
Setting detail: THERAPIES SERIES
Discharge: HOME | End: 2021-09-20
Payer: MEDICARE

## 2021-09-20 PROCEDURE — 92507 TX SP LANG VOICE COMM INDIV: CPT | Performed by: SPEECH-LANGUAGE PATHOLOGIST

## 2021-09-20 NOTE — PROGRESS NOTES
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 Personal long term communication activity goal:  Increase vocal loudness for improved communication with spouse and others. Assessment:    [facility] was targeted this date. Session consisted of week 1 tasks for sustained phonation of /a/x 15, alteration of pitches from natural to high and natural to low with goal of sustained sound for 5 secs,  15 sets each; functional phrase drills of 10 common phrases x5 sets, hierarchical speech loudness drills at the  pharagraph level. Sound Pressure Meter distance from mouth:  40 cm    Goals:   Goal 1: Increase vocal loudness to reach a target SPL of 75-80 dB SPL with min cues during sustained phonation, which will help increase vocal respiratory support required for fx communication. Changed goal, Meeting  Goal 2: Increase vocal loudness to reach a target SPL of 75-80 dB SPL with min cues during reading at the word and sentence level, which will help increase vocal respiratory support required for fx communication Not Met, 64dB  Goal 3: Increase vocal loudness to reach a target SPL of 75 dB SPL with min cues during simple structured conversational speech, which will help increase vocal respiratory support required for fx communication.  Not targeted  Goal 4: Reach a target SPL of 75 dB for longer more complex paragraph level reading and complex conversational speech at 40 cm with min cues to increase loudness Not targeted  Goal 5: Complete daily home exercise program (HEP) independently for sustained phonation tasks, pitch tasks, fx phrase tasks, and carryover tasks as listed with 100% compliance. Meeting, Continue     Date: 9/09/21 9/10/21 9/16/21 9/17/21 9/20/21   # of visits including eval 5 6 7 8 9     Subjective     Required moderate prompting to increase intensity w/ spontaneous speech  Improved awareness and carryover for spontaneous speech  Reduced intensity off the cuff  Pt reports feeling fatigue after [facility] apt yesterday  Improved carryover throughout session   Pt required reduced cueing  Improving carryover and response to cues   Pain level: Denies  Denies  Denies Denies Denies   Sustained /a/ average secs   18.7 sec 18.1 sec 17.8 sec 20.2 sec Cutting off at 13 seconds with improved off the cuff loudness   Sustained /a/ average dB SPL 76.84 dB 76.6 dB 75.6 dB 77.2 dB 78 dB   Range   72-81 dB 72-81 dB 68- 79 dB 70- 81 dB 74-89 dB   Highest Pitch Daily Avg 215 Hz 224 Hz 233.8 Hz 227 Hz 223.67 Hz   Lowest Pitch Daily Avg 158 Hz 156 Hz 156 Hz 149.6 Hz 150.3 Hz   Daily Avg of dB SPL in fx phrases 68.24 dB 69.5 dB 69.1 dB 68.8 dB 71.9 dB   SPL during Reading  (level) words/phrases, sentences, paragraph, conversation Sentences 65.5 dB      Paragraph  63.2 dB Paragraph 64.5 dB Paragraph 63.5 dB Paragraph 64.1 dB      Conversation (introduced with max cueing)  Paragraph 64 dB      Conversation required max cues with increasing response to cueing   Perceived level of effort    Cues for loudness High      Mod Mod-High      Mod High      Max High      Mod High      Mod-Max   SPL for off the cuff questions 62 dB 59.8 dB 58 dB 59.7 dB 60.1 dB  Reduced loudness for [facility]TC with pitch exercises.    Sessions target very structured increased movement amplitude directed predominately to respiratory and laryngeal systems via a daily task focus of sustained, directional, and functional movements, hierarchy training in variable speaking activities, shaping techniques, and sensory calibration as listed above.     Intelligibility/Vocal Quality:  100% intelligibility, good for sustained phonation  Observations:  Improvement from yesterday's session, however pt continues to report fatigue from a busy week   Calibration Tasks: Between task comments, repetition  Homework Assignment: Protocol tasks with carryover at the paragraph/ reading passage  Carryover Assignment: Talking with children over the phone    New Carryover for next session: use loud \"ah\" voice to greet son at the airport and during conversation     Adverse Reactions to treatment: None identified   Education provided to patient/caregiver: reviewed importance of carryover     Treatment/Activity Tolerance:     [x]  Patient tolerated treatment well []  Patient limited by fatique    []  Patient limited by pain []  Patient limited by other medical complications   []  Other:     Time in/out:  1030/1130                                Total Treatment Time:   60 minutes       1 unit speech/voice treatment      Louise Alexandre MA [facility] SLP  Speech Language Pathologist

## 2021-09-22 ENCOUNTER — HOSPITAL ENCOUNTER (OUTPATIENT)
Dept: SPEECH THERAPY | Age: 71
Setting detail: THERAPIES SERIES
Discharge: HOME | End: 2021-09-22
Payer: MEDICARE

## 2021-09-22 PROCEDURE — 92508 TX SP LANG VOICE COMM GROUP: CPT

## 2021-09-22 PROCEDURE — 92507 TX SP LANG VOICE COMM INDIV: CPT

## 2021-09-22 NOTE — PROGRESS NOTES
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 Personal long term communication activity goal:  Increase vocal loudness for improved communication with spouse and others. Assessment:    [facility] was targeted this date. Session consisted of week 1 tasks for sustained phonation of /a/x 15, alteration of pitches from natural to high and natural to low with goal of sustained sound for 5 secs,  15 sets each; functional phrase drills of 10 common phrases x5 sets, hierarchical speech loudness drills at the  pharagraph level. Sound Pressure Meter distance from mouth:  40 cm    Goals:   Goal 1: Increase vocal loudness to reach a target SPL of 75-80 dB SPL with min cues during sustained phonation, which will help increase vocal respiratory support required for fx communication. Changed goal, Meeting  Goal 2: Increase vocal loudness to reach a target SPL of 75-80 dB SPL with min cues during reading at the word and sentence level, which will help increase vocal respiratory support required for fx communication Not Met, 64dB  Goal 3: Increase vocal loudness to reach a target SPL of 75 dB SPL with min cues during simple structured conversational speech, which will help increase vocal respiratory support required for fx communication.  Not targeted  Goal 4: Reach a target SPL of 75 dB for longer more complex paragraph level reading and complex conversational speech at 40 cm with min cues to increase loudness Not targeted  Goal 5: Complete daily home exercise program (HEP) independently for sustained phonation tasks, pitch tasks, fx phrase tasks, and carryover tasks as listed with 100% compliance.  Meeting, Continue     Date: 9/09/21 9/10/21 9/16/21 9/17/21 9/20/21 9/22/21   # of visits including eval 5 6 7 8 9 10     Subjective     Required moderate prompting to increase intensity w/ spontaneous speech  Improved awareness and carryover for spontaneous speech  Reduced intensity off the cuff  Pt reports feeling fatigue after [facility] apt yesterday  Improved carryover throughout session   Pt required reduced cueing  Improving carryover and response to cues Increased carryover with spontaneous speech    Pain level: Denies  Denies  Denies Denies Denies Denies    Sustained /a/ average secs   18.7 sec 18.1 sec 17.8 sec 20.2 sec Cutting off at 13 seconds with improved off the cuff loudness Cut off at 13-14 seconds to improve off the cuff loudness    Sustained /a/ average dB SPL 76.84 dB 76.6 dB 75.6 dB 77.2 dB 78 dB 78 dB   Range   72-81 dB 72-81 dB 68- 79 dB 70- 81 dB 74-89 dB 74-82 dB   Highest Pitch Daily Avg 215 Hz 224 Hz 233.8 Hz 227 Hz 223.67 Hz 229 Hz   Lowest Pitch Daily Avg 158 Hz 156 Hz 156 Hz 149.6 Hz 150.3 Hz 154.9 Hz   Daily Avg of dB SPL in fx phrases 68.24 dB 69.5 dB 69.1 dB 68.8 dB 71.9 dB 72.8 dB   SPL during Reading  (level) words/phrases, sentences, paragraph, conversation Sentences 65.5 dB      Paragraph  63.2 dB Paragraph 64.5 dB Paragraph 63.5 dB Paragraph 64.1 dB      Conversation (introduced with max cueing)  Paragraph 64 dB      Conversation required max cues with increasing response to cueing Paragraph 65 dB      Conversation 60 dB  Continues to requires max cues to increase cueing    Perceived level of effort    Cues for loudness High      Mod Mod-High      Mod High      Max High      Mod High      Mod-Max High      Mod- Max   SPL for off the cuff questions 62 dB 59.8 dB 58 dB 59.7 dB 60.1 dB  Reduced loudness for OTC with pitch exercises. 60 dB   Sessions target very structured increased movement amplitude directed predominately to respiratory and laryngeal systems via a daily task focus of sustained, directional, and functional movements, hierarchy training in variable speaking activities, shaping techniques, and sensory calibration as listed above.     Intelligibility/Vocal Quality:  100% intelligibility, good for sustained phonation  Observations:  Improvement from yesterday's session, however pt continues to report fatigue from a busy week   Calibration Tasks: Between task comments, repetition  Homework Assignment: Protocol tasks with carryover at the paragraph/ reading passage  Carryover Assignment: Talking with children over the phone    New Carryover for next session: use loud \"ah\" voice to greet son at the airport and during conversation     Adverse Reactions to treatment: None identified   Education provided to patient/caregiver: reviewed importance of carryover     Treatment/Activity Tolerance:     [x]  Patient tolerated treatment well []  Patient limited by fatique    []  Patient limited by pain []  Patient limited by other medical complications   []  Other:     Time in/out:  8052-1059                               Total Treatment Time:   60 minutes       1 unit speech/voice treatment      Nicole Nash Sriram , [address] [place] [address], 9/22/2021

## 2021-09-23 ENCOUNTER — HOSPITAL ENCOUNTER (OUTPATIENT)
Dept: SPEECH THERAPY | Age: 71
Setting detail: THERAPIES SERIES
Discharge: HOME | End: 2021-09-23
Payer: MEDICARE

## 2021-09-23 PROCEDURE — 92507 TX SP LANG VOICE COMM INDIV: CPT

## 2021-09-23 NOTE — PROGRESS NOTES
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 Personal long term communication activity goal:  Increase vocal loudness for improved communication with spouse and others. Assessment:    [facility] was targeted this date. Session consisted of week 1 tasks for sustained phonation of /a/x 15, alteration of pitches from natural to high and natural to low with goal of sustained sound for 5 secs,  15 sets each; functional phrase drills of 10 common phrases x5 sets, hierarchical speech loudness drills at the  pharagraph level. Sound Pressure Meter distance from mouth:  40 cm    Goals:   Goal 1: Increase vocal loudness to reach a target SPL of 75-80 dB SPL with min cues during sustained phonation, which will help increase vocal respiratory support required for fx communication. Changed goal, Meeting  Goal 2: Increase vocal loudness to reach a target SPL of 75-80 dB SPL with min cues during reading at the word and sentence level, which will help increase vocal respiratory support required for fx communication Not Met, 64dB  Goal 3: Increase vocal loudness to reach a target SPL of 75 dB SPL with min cues during simple structured conversational speech, which will help increase vocal respiratory support required for fx communication.  Not targeted  Goal 4: Reach a target SPL of 75 dB for longer more complex paragraph level reading and complex conversational speech at 40 cm with min cues to increase loudness Not targeted  Goal 5: Complete daily home exercise program (HEP) independently for sustained phonation tasks, pitch tasks, fx phrase tasks, and carryover tasks as listed with 100% compliance. Meeting, Continue     Date: 9/17/21 9/20/21 9/22/21 9/23/21    # of visits including eval 8 9 10 11      Subjective     Improved carryover throughout session   Pt required reduced cueing  Improving carryover and response to cues Increased carryover with spontaneous speech  Pt required increased cueing this date- reported fatigue     Pain level: Denies Denies Denies  Denies     Sustained /a/ average secs   20.2 sec Cutting off at 13 seconds with improved off the cuff loudness Cut off at 13-14 seconds to improve off the cuff loudness  Cut off 13-14 seconds     Sustained /a/ average dB SPL 77.2 dB 78 dB 78 dB 77.7 dB    Range   70- 81 dB 74-89 dB 74-82 dB 72-80 dB    Highest Pitch Daily Avg 227 Hz 223.67 Hz 229 Hz 222.6 Hz    Lowest Pitch Daily Avg 149.6 Hz 150.3 Hz 154.9 Hz 154.2 Hz    Daily Avg of dB SPL in fx phrases 68.8 dB 71.9 dB 72.8 dB 71.8 dB    SPL during Reading  (level) words/phrases, sentences, paragraph, conversation Paragraph 64.1 dB      Conversation (introduced with max cueing)  Paragraph 64 dB      Conversation required max cues with increasing response to cueing Paragraph 65 dB      Conversation 60 dB  Continues to requires max cues to increase cueing  Paragraph 63 dB      Conversation 61    Max cues to increase volume     Perceived level of effort    Cues for loudness High      Mod High      Mod-Max High      Mod- Max High      Mod-max    SPL for off the cuff questions 59.7 dB 60.1 dB  Reduced loudness for OTC with pitch exercises.  60 dB 59 dB    Sessions target very structured increased movement amplitude directed predominately to respiratory and laryngeal systems via a daily task focus of sustained, directional, and functional movements, hierarchy training in variable speaking activities, shaping techniques, and sensory calibration as listed above.     Intelligibility/Vocal Quality:  100% intelligibility, good for sustained phonation  Observations:  Improvement from yesterday's session, however pt continues to report fatigue from a busy week   Calibration Tasks: Between task comments, repetition  Homework Assignment: Protocol tasks with carryover at the paragraph/ reading passage/ conversation   Carryover Assignment: Saying goodbye to son at the airport    New Carryover for next session: use loud \"ah\" voice to greet SLP in [facility] tomorrow    Adverse Reactions to treatment: None identified   Education provided to patient/caregiver: reviewed importance of carryover     Treatment/Activity Tolerance:     [x]  Patient tolerated treatment well []  Patient limited by fatique    []  Patient limited by pain []  Patient limited by other medical complications   []  Other:     Time in/out:  5913-5501                             Total Treatment Time:   60 minutes       1 unit speech/voice treatment      Lanna Prader, Luite Sriram 89, María Flowers, 9/23/2021

## 2021-09-24 ENCOUNTER — HOSPITAL ENCOUNTER (OUTPATIENT)
Dept: SPEECH THERAPY | Age: 71
Setting detail: THERAPIES SERIES
Discharge: HOME | End: 2021-09-24
Payer: MEDICARE

## 2021-09-24 PROCEDURE — 92507 TX SP LANG VOICE COMM INDIV: CPT

## 2021-09-24 NOTE — PROGRESS NOTES
longer more complex paragraph level reading and complex conversational speech at 40 cm with min cues to increase loudness Not targeted  Goal 5: Complete daily home exercise program (HEP) independently for sustained phonation tasks, pitch tasks, fx phrase tasks, and carryover tasks as listed with 100% compliance.  Meeting, Continue     Date: 9/17/21 9/20/21 9/22/21 9/23/21 9/24/21   # of visits including eval 8 9 10 11 12     Subjective     Improved carryover throughout session   Pt required reduced cueing  Improving carryover and response to cues Increased carryover with spontaneous speech  Pt required increased cueing this date- reported fatigue  Increased carryover throughout session    Pain level: Denies Denies Denies  Denies  Denies   Sustained /a/ average secs   20.2 sec Cutting off at 13 seconds with improved off the cuff loudness Cut off at 13-14 seconds to improve off the cuff loudness  Cut off 13-14 seconds  Cut ff at 13-15 seconds    Sustained /a/ average dB SPL 77.2 dB 78 dB 78 dB 77.7 dB 78.6 dB   Range   70- 81 dB 74-89 dB 74-82 dB 72-80 dB 83-82 dB   Highest Pitch Daily Avg 227 Hz 223.67 Hz 229 Hz 222.6 Hz 219.5 Hz   Lowest Pitch Daily Avg 149.6 Hz 150.3 Hz 154.9 Hz 154.2 Hz 150.3 Hz   Daily Avg of dB SPL in fx phrases 68.8 dB 71.9 dB 72.8 dB 71.8 dB 71.6 dB   SPL during Reading  (level) words/phrases, sentences, paragraph, conversation Paragraph 64.1 dB      Conversation (introduced with max cueing)  Paragraph 64 dB      Conversation required max cues with increasing response to cueing Paragraph 65 dB      Conversation 60 dB  Continues to requires max cues to increase cueing  Paragraph 63 dB      Conversation 61    Max cues to increase volume  Paragraph 64.3 dB      Conversation 61.7 dB    Mod cues provided to  Increase volume    Perceived level of effort    Cues for loudness High      Mod High      Mod-Max High      Mod- Max High      Mod-max High      Mod   SPL for off the cuff questions 59.7 dB 60.1 dB  Reduced loudness for OTC with pitch exercises. 60 dB 59 dB 60 dB   Sessions target very structured increased movement amplitude directed predominately to respiratory and laryngeal systems via a daily task focus of sustained, directional, and functional movements, hierarchy training in variable speaking activities, shaping techniques, and sensory calibration as listed above.     Intelligibility/Vocal Quality:  100% intelligibility, good for sustained phonation  Observations:  Improvement from yesterday's session, however pt continues to report fatigue from a busy week   Calibration Tasks: Between task comments, repetition  Homework Assignment: Protocol tasks with carryover at the paragraph/ reading passage/ conversation   Carryover Assignment: Using loud voice in a restaurant with    Cam Rad for next session: use loud \"ah\" voice to greet SLP in [facility] tomorrow    Adverse Reactions to treatment: None identified   Education provided to patient/caregiver: reviewed importance of carryover     Treatment/Activity Tolerance:     [x]  Patient tolerated treatment well []  Patient limited by fatique    []  Patient limited by pain []  Patient limited by other medical complications   []  Other:     Time in/out:  9738-2768                            Total Treatment Time:   60 minutes       1 unit speech/voice treatment      Nicole Ho Sriram 87, [id number] [facility], [place], 9/24/2021

## 2021-09-27 ENCOUNTER — HOSPITAL ENCOUNTER (OUTPATIENT)
Dept: SPEECH THERAPY | Age: 71
Setting detail: THERAPIES SERIES
Discharge: HOME | End: 2021-09-27
Payer: MEDICARE

## 2021-09-27 PROCEDURE — 92507 TX SP LANG VOICE COMM INDIV: CPT | Performed by: SPEECH-LANGUAGE PATHOLOGIST

## 2021-09-27 NOTE — PROGRESS NOTES
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 Personal long term communication activity goal:  Increase vocal loudness for improved communication with spouse and others. Assessment:    [facility] was targeted this date. Session consisted of week 4 tasks for sustained phonation of /a/x 15, alteration of pitches from natural to high and natural to low with goal of sustained sound for 5 secs,  15 sets each; functional phrase drills of 10 common phrases x5 sets, hierarchical speech loudness drills at the  pharagraph and conversation level. Sound Pressure Meter distance from mouth:  40 cm    Goals:   Goal 1: Increase vocal loudness to reach a target SPL of 75-80 dB SPL with min cues during sustained phonation, which will help increase vocal respiratory support required for fx communication. Meeting  Goal 2: Increase vocal loudness to reach a target SPL of 75-80 dB SPL with min cues during reading at the word and sentence level, which will help increase vocal respiratory support required for fx communication Not Met, 72.2 dB  Goal 3: Increase vocal loudness to reach a target SPL of 75 dB SPL with min cues during simple structured conversational speech, which will help increase vocal respiratory support required for fx communication.  Not Met, 60-62 dB  Goal 4: Reach a target SPL of 75 dB for longer more complex paragraph level reading and complex conversational speech at 40 cm with min cues to increase loudness Not Met, Continue  Goal 5: Complete daily home exercise program (HEP) independently for sustained phonation tasks, pitch tasks, fx phrase tasks, and carryover tasks as listed with 100% compliance.  Meeting, Continue     Date: 9/17/21 9/20/21 9/22/21 9/23/21 9/24/21 9/27/21   # of visits including eval 8 9 10 11 12 13     Subjective     Improved carryover throughout session   Pt required reduced cueing  Improving carryover and response to cues Increased carryover with spontaneous speech  Pt required increased cueing this date- reported fatigue  Increased carryover throughout session  Reports she is slightly fatigued after LSVT Big practice this am   Pain level: Denies Denies Denies  Denies  Denies Denies   Sustained /a/ average secs   20.2 sec Cutting off at 13 seconds with improved off the cuff loudness Cut off at 13-14 seconds to improve off the cuff loudness  Cut off 13-14 seconds  Cut ff at 13-15 seconds  Cut off at 13-14 seconds   Sustained /a/ average dB SPL 77.2 dB 78 dB 78 dB 77.7 dB 78.6 dB 80 dB   Range   70- 81 dB 74-89 dB 74-82 dB 72-80 dB 83-82 dB 73-84 dB   Highest Pitch Daily Avg 227 Hz 223.67 Hz 229 Hz 222.6 Hz 219.5 Hz 224 Hz   Lowest Pitch Daily Avg 149.6 Hz 150.3 Hz 154.9 Hz 154.2 Hz 150.3 Hz 158 Hz   Daily Avg of dB SPL in fx phrases 68.8 dB 71.9 dB 72.8 dB 71.8 dB 71.6 dB 72.2 dB   SPL during Reading  (level) words/phrases, sentences, paragraph, conversation Paragraph 64.1 dB      Conversation (introduced with max cueing)  Paragraph 64 dB      Conversation required max cues with increasing response to cueing Paragraph 65 dB      Conversation 60 dB  Continues to requires max cues to increase cueing  Paragraph 63 dB      Conversation 61    Max cues to increase volume  Paragraph 64.3 dB      Conversation 61.7 dB    Mod cues provided to  Increase volume  Paragraph (long) 60 dB    Conversation w/sorting activity  62 dB   Perceived level of effort    Cues for loudness High      Mod High      Mod-Max High      Mod- Max High      Mod-max High      Mod High      Min   SPL for off the cuff questions 59.7 dB 60.1 dB  Reduced loudness for OTC with pitch exercises. 60 dB 59 dB 60 dB 60 dB   Sessions target very structured increased movement amplitude directed predominately to respiratory and laryngeal systems via a daily task focus of sustained, directional, and functional movements, hierarchy training in variable speaking activities, shaping techniques, and sensory calibration as listed above. Intelligibility/Vocal Quality:  100% intelligibility, good for sustained phonation  Observations:  Improvement from yesterday's session, however pt continues to report fatigue from a busy week   Calibration Tasks: Plan for question and response in loud ah voice for Tues  Homework Assignment: Protocol tasks with carryover at the paragraph/ reading passage/ conversation   Carryover Assignment: Using loud voice in a restaurant with    Latonia Wilhelm for next session: talk over the tv to Samanta Meadows, breakfast with sisters on Friday (sisters often talk over her). She plans to use her loud \"ah\" voice particularly on her initial words when she wants to interject in order to get her sister's attention.     Adverse Reactions to treatment: None identified   Education provided to patient/caregiver: reviewed importance of carryover     Treatment/Activity Tolerance:     [x]  Patient tolerated treatment well []  Patient limited by fatique    []  Patient limited by pain []  Patient limited by other medical complications   []  Other:     Time in/out:  [id number]                            Total Treatment Time:   60 minutes       1 unit speech/voice treatment      Nicole Matt Sriram 87, [place], 9/27/2021

## 2021-09-28 ENCOUNTER — HOSPITAL ENCOUNTER (OUTPATIENT)
Dept: SPEECH THERAPY | Age: 71
Setting detail: THERAPIES SERIES
Discharge: HOME | End: 2021-09-28
Payer: MEDICARE

## 2021-09-28 PROCEDURE — 92507 TX SP LANG VOICE COMM INDIV: CPT | Performed by: SPEECH-LANGUAGE PATHOLOGIST

## 2021-09-28 NOTE — PROGRESS NOTES
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 Personal long term communication activity goal:  Increase vocal loudness for improved communication with spouse and others. Assessment:    Renee Javed Voice Therapy was targeted this date. Session consisted of week 4 tasks for sustained phonation of /a/x 15, alteration of pitches from natural to high and natural to low with goal of sustained sound for 5 secs,  15 sets each; functional phrase drills of 10 common phrases x5 sets, hierarchical speech loudness drills at the  Structured Q & A and conversation level. Sound Pressure Meter distance from mouth:  40 cm    Goals:   Goal 1: Increase vocal loudness to reach a target SPL of 75-80 dB SPL with min cues during sustained phonation, which will help increase vocal respiratory support required for fx communication. Met  Goal 2: Increase vocal loudness to reach a target SPL of 75-80 dB SPL with min cues during reading at the word and sentence level, which will help increase vocal respiratory support required for fx communication Not Targeted  Goal 3: Increase vocal loudness to reach a target SPL of 75 dB SPL with min cues during simple structured conversational speech, which will help increase vocal respiratory support required for fx communication.  Not Met, 60-62 dB, however, subjectively pt is louder and at an appropriate volume for the environment. Goal 4: Reach a target SPL of 75 dB for longer more complex paragraph level reading and complex conversational speech at 40 cm with min cues to increase loudness Not Met, Continue  Goal 5: Complete daily home exercise program (HEP) independently for sustained phonation tasks, pitch tasks, fx phrase tasks, and carryover tasks as listed with 100% compliance.  Meeting, Continue     Date: 9/17/21 9/20/21 9/22/21 9/23/21 9/24/21 9/27/21 9/28/21   # of visits including eval 8 9 10 11 12 13 14     Subjective     Improved carryover throughout session   Pt required reduced cueing  Improving carryover and response to cues Increased carryover with spontaneous speech  Pt required increased cueing this date- reported fatigue  Increased carryover throughout session  Reports she is slightly fatigued after LSVT Big practice this am Reports slight fatigue   Pain level: Denies Denies Denies  Denies  Denies Denies Denies   Sustained /a/ average secs   20.2 sec Cutting off at 13 seconds with improved off the cuff loudness Cut off at 13-14 seconds to improve off the cuff loudness  Cut off 13-14 seconds  Cut ff at 13-15 seconds  Cut off at 13-14 seconds Cut off at 13 seconds due to fatigue   Sustained /a/ average dB SPL 77.2 dB 78 dB 78 dB 77.7 dB 78.6 dB 80 dB 78 dB   Range   70- 81 dB 74-89 dB 74-82 dB 72-80 dB 83-82 dB 73-84 dB 73-80 dB   Highest Pitch Daily Avg 227 Hz 223.67 Hz 229 Hz 222.6 Hz 219.5 Hz 224 Hz 230 Hz   Lowest Pitch Daily Avg 149.6 Hz 150.3 Hz 154.9 Hz 154.2 Hz 150.3 Hz 158 Hz 152 Hz   Daily Avg of dB SPL in fx phrases 68.8 dB 71.9 dB 72.8 dB 71.8 dB 71.6 dB 72.2 dB 70 dB   SPL during Reading  (level) words/phrases, sentences, paragraph, conversation Paragraph 64.1 dB      Conversation (introduced with max cueing)  Paragraph 64 dB      Conversation required max cues with increasing response to cueing Paragraph 65 dB      Conversation 60 dB  Continues to requires max cues to increase cueing  Paragraph 63 dB      Conversation 61    Max cues to increase volume  Paragraph 64.3 dB      Conversation 61.7 dB    Mod cues provided to  Increase volume  Paragraph (long) 60 dB    Conversation w/sorting activity  62 dB DNT Paragraph  Targeted Q & A with goal of >60 dB    Pt met 63.8 dB average over 10-15 min activity   Perceived level of effort    Cues for loudness High      Mod High      Mod-Max High      Mod- Max High      Mod-max High      Mod High      Min High      Min   SPL for off the cuff questions 59.7 dB 60.1 dB  Reduced loudness for OTC with pitch exercises. 60 dB 59 dB 60 dB 60 dB 61 dB   Sessions target very structured increased movement amplitude directed predominately to respiratory and laryngeal systems via a daily task focus of sustained, directional, and functional movements, hierarchy training in variable speaking activities, shaping techniques, and sensory calibration as listed above. Intelligibility/Vocal Quality:  100% intelligibility, good for sustained phonation  Observations:  pt reports fatigue from a busy week   Calibration Tasks: Board game or other 2 person activity with use of loud \"ah\" voice  Homework Assignment: Protocol tasks with carryover at the paragraph/ reading passage/ conversation  Carryover Assignment: Talk over the tv to Rachel Sierra - DNT, reports difficulty and discouragement with outdoor conversation (windy conditions) since others could not hear her. New Carryover for next session: talk over the tv to Rachel Sierra, breakfast with sisters on Friday (sisters often talk over her). She plans to use her loud \"ah\" voice particularly on her initial words when she wants to interject in order to get her sister's attention.     Adverse Reactions to treatment: None identified   Education provided to patient/caregiver: reviewed importance of carryover     Treatment/Activity Tolerance:     [x]  Patient tolerated treatment well []  Patient limited by arthur    []  Patient limited by pain []  Patient limited by other medical complications   []  Other:     Time in/out:  10:30/11:30                            Total Treatment Time:   60 minutes       1 unit speech/voice treatment      Rodrick Mendoza MA [facility] SLP  Speech Language Pathologist

## 2021-09-29 ENCOUNTER — HOSPITAL ENCOUNTER (OUTPATIENT)
Dept: SPEECH THERAPY | Age: 71
Setting detail: THERAPIES SERIES
Discharge: HOME | End: 2021-09-29
Payer: MEDICARE

## 2021-09-29 PROCEDURE — 92507 TX SP LANG VOICE COMM INDIV: CPT

## 2021-09-29 NOTE — PROGRESS NOTES
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 Personal long term communication activity goal:  Increase vocal loudness for improved communication with spouse and others. Assessment:    Lakisha Quinn Voice Therapy was targeted this date. Session consisted of week 4 tasks for sustained phonation of /a/x 15, alteration of pitches from natural to high and natural to low with goal of sustained sound for 5 secs,  15 sets each; functional phrase drills of 10 common phrases x5 sets, hierarchical speech loudness drills at the  Structured Q & A and conversation level. Sound Pressure Meter distance from mouth:  40 cm    Goals:   Goal 1: Increase vocal loudness to reach a target SPL of 75-80 dB SPL with min cues during sustained phonation, which will help increase vocal respiratory support required for fx communication. Met  Goal 2: Increase vocal loudness to reach a target SPL of 75-80 dB SPL with min cues during reading at the word and sentence level, which will help increase vocal respiratory support required for fx communication Not Targeted  Goal 3: Increase vocal loudness to reach a target SPL of 75 dB SPL with min cues during simple structured conversational speech, which will help increase vocal respiratory support required for fx communication.  Not Met, 60-62 dB, however, subjectively pt is louder and at an appropriate volume for the environment. Goal 4: Reach a target SPL of 75 dB for longer more complex paragraph level reading and complex conversational speech at 40 cm with min cues to increase loudness Not Met, Continue  Goal 5: Complete daily home exercise program (HEP) independently for sustained phonation tasks, pitch tasks, fx phrase tasks, and carryover tasks as listed with 100% compliance.  Meeting, Continue     Date: 9/24/21 9/27/21 9/28/21 9/29/21     # of visits including eval 12 13 14 15       Subjective     Increased carryover throughout session  Reports she is slightly fatigued after LSVT Big practice this am Reports slight fatigue Increased carryover      Pain level: Denies Denies Denies Denies      Sustained /a/ average secs   Cut ff at 13-15 seconds  Cut off at 13-14 seconds Cut off at 13 seconds due to fatigue Cut off at 13-15 seconds      Sustained /a/ average dB SPL 78.6 dB 80 dB 78 dB 79 dB     Range   83-82 dB 73-84 dB 73-80 dB 74-82 dB     Highest Pitch Daily Avg 219.5 Hz 224 Hz 230 Hz 225 Hz     Lowest Pitch Daily Avg 150.3 Hz 158 Hz 152 Hz 155.7 Hz     Daily Avg of dB SPL in fx phrases 71.6 dB 72.2 dB 70 dB 71.6 dB     SPL during Reading  (level) words/phrases, sentences, paragraph, conversation Paragraph 64.3 dB      Conversation 61.7 dB    Mod cues provided to  Increase volume  Paragraph (long) 60 dB    Conversation w/sorting activity  62 dB DNT Paragraph  Targeted Q & A with goal of >60 dB    Pt met 63.8 dB average over 10-15 min activity Targeted Q & A average at 63 dB    Will target conversation in public location tomorrow      Perceived level of effort    Cues for loudness High      Mod High      Min High      Min High      Min     SPL for off the cuff questions 60 dB 60 dB 61 dB 61 dB     Sessions target very structured increased movement amplitude directed predominately to respiratory and laryngeal systems via a daily task focus of sustained, directional, and functional movements, hierarchy training in variable speaking activities, shaping techniques, and sensory calibration as listed above. Intelligibility/Vocal Quality:  100% intelligibility, good for sustained phonation  Observations:  pt reports fatigue from a busy week   Calibration Tasks: Board game or other 2 person activity with use of loud \"ah\" voice  Homework Assignment: Protocol tasks with carryover at the paragraph/ reading passage/ conversation  Carryover Assignment: Talk over the tv to Raymond Pérez, reports difficulty and discouragement with outdoor conversation (windy conditions) since others could not hear her. New Carryover for next session: Use loud \"ah\" voice at [employer] store later today.     Adverse Reactions to treatment: None identified   Education provided to patient/caregiver: reviewed importance of carryover     Treatment/Activity Tolerance:     [x]  Patient tolerated treatment well []  Patient limited by fatique    []  Patient limited by pain []  Patient limited by other medical complications   []  Other:     Time in/out:  10:30/11:30                            Total Treatment Time:   60 minutes       1 unit speech/voice treatment      Nicole Echeverria Sriram 87, [place]icit[place] Car[place], 9/29/2021

## 2021-09-30 ENCOUNTER — HOSPITAL ENCOUNTER (OUTPATIENT)
Dept: SPEECH THERAPY | Age: 71
Setting detail: THERAPIES SERIES
Discharge: HOME | End: 2021-09-30
Payer: MEDICARE

## 2021-09-30 PROCEDURE — 92507 TX SP LANG VOICE COMM INDIV: CPT | Performed by: SPEECH-LANGUAGE PATHOLOGIST

## 2021-09-30 PROCEDURE — 92507 TX SP LANG VOICE COMM INDIV: CPT

## 2021-09-30 NOTE — PROGRESS NOTES
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 Personal long term communication activity goal:  Increase vocal loudness for improved communication with spouse and others. Assessment:    [facility] was targeted this date. Session consisted of week 4 tasks for sustained phonation of /a/x 15, alteration of pitches from natural to high and natural to low with goal of sustained sound for 5 secs,  15 sets each; functional phrase drills of 10 common phrases x5 sets, hierarchical speech loudness drills at the  Structured Q & A and conversation level. Sound Pressure Meter distance from mouth:  40 cm    Goals:   Goal 1: Increase vocal loudness to reach a target SPL of 75-80 dB SPL with min cues during sustained phonation, which will help increase vocal respiratory support required for fx communication. Met  Goal 2: Increase vocal loudness to reach a target SPL of 75-80 dB SPL with min cues during reading at the word and sentence level, which will help increase vocal respiratory support required for fx communication Not Targeted  Goal 3: Increase vocal loudness to reach a target SPL of 75 dB SPL with min cues during simple structured conversational speech, which will help increase vocal respiratory support required for fx communication.  Not Met, 60-62 dB, however, subjectively pt is louder and at an appropriate volume for the environment. Goal 4: Reach a target SPL of 75 dB for longer more complex paragraph level reading and complex conversational speech at 40 cm with min cues to increase loudness Not Met, Continue  Goal 5: Complete daily home exercise program (HEP) independently for sustained phonation tasks, pitch tasks, fx phrase tasks, and carryover tasks as listed with 100% compliance.  Meeting, Continue     Date: 9/24/21 9/27/21 9/28/21 9/29/21 9/30/21    # of visits including eval 12 13 14 15 16      Subjective     Increased carryover throughout session  Reports she is slightly fatigued after LSVT Big practice this am Reports slight fatigue Increased carryover  Pt reports increased fatigue     Pain level: Denies Denies Denies Denies  Denies     Sustained /a/ average secs   Cut ff at 13-15 seconds  Cut off at 13-14 seconds Cut off at 13 seconds due to fatigue Cut off at 13-15 seconds  Cut off at 13-15 seconds     Sustained /a/ average dB SPL 78.6 dB 80 dB 78 dB 79 dB 78.9 dB    Range   83-82 dB 73-84 dB 73-80 dB 74-82 dB 76-82 dB    Highest Pitch Daily Avg 219.5 Hz 224 Hz 230 Hz 225 Hz 221 Hz    Lowest Pitch Daily Avg 150.3 Hz 158 Hz 152 Hz 155.7 Hz 148 Hz    Daily Avg of dB SPL in fx phrases 71.6 dB 72.2 dB 70 dB 71.6 dB 73.3 dB    SPL during Reading  (level) words/phrases, sentences, paragraph, conversation Paragraph 64.3 dB      Conversation 61.7 dB    Mod cues provided to  Increase volume  Paragraph (long) 60 dB    Conversation w/sorting activity  62 dB DNT Paragraph  Targeted Q & A with goal of >60 dB    Pt met 63.8 dB average over 10-15 min activity Targeted Q & A average at 63 dB    Will target conversation in public location tomorrow  Targeted conversation level in public place (hospital cafeteria)     Pt required moderate prompting to increase volume at conversation level     Perceived level of effort    Cues for loudness High      Mod High      Min High      Min High      Min High      Min- mod     SPL for off the cuff questions 60 dB 60 dB 61 dB 61 dB 62 dB    Sessions target very structured increased movement amplitude directed predominately to respiratory and laryngeal systems via a daily task focus of sustained, directional, and functional movements, hierarchy training in variable speaking activities, shaping techniques, and sensory calibration as listed above. Intelligibility/Vocal Quality:  100% intelligibility, good for sustained phonation  Observations:  pt reports fatigue from a busy week   Calibration Tasks: Board game or other 2 person activity with use of loud \"ah\" voice  Homework Assignment: Protocol tasks with carryover at the paragraph/ reading passage/ conversation  Carryover Assignment: Talk over the tv to Daniel Carpenter - NAHOMI, reports difficulty and discouragement with outdoor conversation (windy conditions) since others could not hear her. New Carryover for next session: Use loud \"ah\" voice at [employer] later today.     Adverse Reactions to treatment: None identified   Education provided to patient/caregiver: reviewed importance of carryover     Treatment/Activity Tolerance:     [x]  Patient tolerated treatment well []  Patient limited by fatique    []  Patient limited by pain []  Patient limited by other medical complications   []  Other:     Time in/out:  10:30/11:30                            Total Treatment Time:   60 minutes       1 unit speech/voice treatment      Nicole Kulkarni Sriram , [id number] [facility], 9/30/2021

## 2021-10-05 NOTE — PROGRESS NOTES
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 Personal long term communication activity goal:  Increase vocal loudness for improved communication with spouse and others. Assessment:    Jolly [facility]go Voice Therapy was targeted this date. Session consisted of week 4 tasks for sustained phonation of /a/x 15, alteration of pitches from natural to high and natural to low with goal of sustained sound for 5 secs,  15 sets each; functional phrase drills of 10 common phrases x5 sets, hierarchical speech loudness drills at the  Structured Q & A and conversation level. Sound Pressure Meter distance from mouth:  40 cm    Goals:   Goal 1: Increase vocal loudness to reach a target SPL of 75-80 dB SPL with min cues during sustained phonation, which will help increase vocal respiratory support required for fx communication. Met, Discontinue  Goal 2: Increase vocal loudness to reach a target SPL of 75-80 dB SPL with min cues during reading at the word and sentence level, which will help increase vocal respiratory support required for fx communication Not Met, Discontinue  Goal 3: Increase vocal loudness to reach a target SPL of 75 dB SPL with min cues during simple structured conversational speech, which will help increase vocal respiratory support required for fx communication.  Not Met, 60-62 dB, however, subjectively pt is louder and at an appropriate volume for the environment. Continues to require cues for public environment with background noise. Goal 4: Reach a target SPL of 75 dB for longer more complex paragraph level reading and complex conversational speech at 40 cm with min cues to increase loudness Not Met, Discontinue  Goal 5: Complete daily home exercise program (HEP) independently for sustained phonation tasks, pitch tasks, fx phrase tasks, and carryover tasks as listed with 100% compliance.  Met, Discontinue    Date: 9/24/21 9/27/21 9/28/21 9/29/21 9/30/21 10/5/21   # of visits including eval 12 13 14 15 16 17 (including eval)  Last session     Subjective     Increased carryover throughout session  Reports she is slightly fatigued after LSVT Big practice this am Reports slight fatigue Increased carryover  Pt reports increased fatigue  Reports increased quality with evening exercises   Pain level: Denies Denies Denies Denies  Denies  Denies   Sustained /a/ average secs   Cut ff at 13-15 seconds  Cut off at 13-14 seconds Cut off at 13 seconds due to fatigue Cut off at 13-15 seconds  Cut off at 13-15 seconds  Cut off at 11-12 seconds due to fatigue, reduced quality   Sustained /a/ average dB SPL 78.6 dB 80 dB 78 dB 79 dB 78.9 dB 78.6 dB   Range   83-82 dB 73-84 dB 73-80 dB 74-82 dB 76-82 dB 76-81 dB   Highest Pitch Daily Avg 219.5 Hz 224 Hz 230 Hz 225 Hz 221 Hz 230 Hz   Lowest Pitch Daily Avg 150.3 Hz 158 Hz 152 Hz 155.7 Hz 148 Hz 153 Hz   Daily Avg of dB SPL in fx phrases 71.6 dB 72.2 dB 70 dB 71.6 dB 73.3 dB 71.2 dB   SPL during Reading  (level) words/phrases, sentences, paragraph, conversation Paragraph 64.3 dB      Conversation 61.7 dB    Mod cues provided to  Increase volume  Paragraph (long) 60 dB    Conversation w/sorting activity  62 dB DNT Paragraph  Targeted Q & A with goal of >60 dB    Pt met 63.8 dB average over 10-15 min activity Targeted Q & A average at 63 dB    Will target conversation in public location tomorrow  Targeted conversation level in public place (hospital cafeteria)     Pt required moderate prompting to increase volume at conversation level  Targeted conversation in cafeteria with background noise. Volume functionally adequate for conversation 90%. Asked her to repeat x 2. Perceived level of effort    Cues for loudness High      Mod High      Min High      Min High      Min High      Min- mod  Mod-High      Min    SPL for off the cuff questions 60 dB 60 dB 61 dB 61 dB 62 dB 60 dB   Sessions target very structured increased movement amplitude directed predominately to respiratory and laryngeal systems via a daily task focus of sustained, directional, and functional movements, hierarchy training in variable speaking activities, shaping techniques, and sensory calibration as listed above. Discharge summary:  Pt improved loudness of spontaneous output (off the cuff) from an average of 58 dB to 60-62 dB. Calibration from loud \"Ah\" voice to paragraph reading not met. Pt exhibited increased success in structured conversational tasks (Q & A). Encouraged pt and her spouse to continue this activity as well as playing board games requiring verbal output with the goal of vocal loudness throughout. They verbalized understanding and report they play [employer]. Pt will f/u for \"booster\" in 6 months or less if needed. Intelligibility/Vocal Quality:  100% intelligibility, good for sustained phonation  Observations:  Continues to require cues/need for repetition in public area with increased background noise  Calibration Tasks: Board game or other 2 person activity with use of loud \"ah\" voice  Homework Assignment: Protocol tasks with carryover at the conversation level  Carryover Assignment: Talk over the tv to [place] [employer] reports loudness varies.   Dakota Shreyas reports she self-corrected and starts over if she realizes she is not loud enough  New Carryover for next session: n/a    Adverse Reactions to treatment: None identified   Education provided to patient/caregiver:  D/c education provided:  Continue daily exercises, think Jeni Stevens will provide feedback for loudness as needed. Recommend return for \"booster\" or repeat of program as needed 3-6 months.     Treatment/Activity Tolerance:     [x]  Patient tolerated treatment well []  Patient limited by fatique    []  Patient limited by pain []  Patient limited by other medical complications   []  Other:     Time in/out:  10:30/11:30                            Total Treatment Time:   60 minutes       1 unit speech/voice treatment      Nicanor Kenney MA [facility] SLP  Speech Language Pathologist     10/5/2021

## 2021-12-19 LAB
ALBUMIN SERPL-MCNC: 4.6 G/DL
ALP BLD-CCNC: 52 U/L
ALT SERPL-CCNC: 9 U/L
ANION GAP SERPL CALCULATED.3IONS-SCNC: 1.7 MMOL/L
AST SERPL-CCNC: 23 U/L
BASOPHILS ABSOLUTE: 0.1 /ΜL
BASOPHILS RELATIVE PERCENT: 1 %
BILIRUB SERPL-MCNC: 0.5 MG/DL (ref 0.1–1.4)
BUN BLDV-MCNC: 21 MG/DL
CALCIUM SERPL-MCNC: 9.2 MG/DL
CHLORIDE BLD-SCNC: 102 MMOL/L
CHOLESTEROL TOTAL, NMR: 224 MG/DL
CO2: 26 MMOL/L
CREAT SERPL-MCNC: 0.69 MG/DL
EOSINOPHILS ABSOLUTE: 0.2 /ΜL
EOSINOPHILS RELATIVE PERCENT: 2 %
GFR CALCULATED: 89
GLUCOSE BLD-MCNC: 86 MG/DL
HCT VFR BLD CALC: 41.6 % (ref 36–46)
HDL PARTICLE NO, NMR: 47.6
HEMOGLOBIN: 13.8 G/DL (ref 12–16)
LDL CHOLESTEROL, NMR: 113
LDL PARTICLE NUMBER, NMR: 986
LYMPHOCYTES ABSOLUTE: 1.7 /ΜL
LYMPHOCYTES RELATIVE PERCENT: 25 %
MCH RBC QN AUTO: 29.9 PG
MCHC RBC AUTO-ENTMCNC: 33.2 G/DL
MCV RBC AUTO: 90 FL
MONOCYTES ABSOLUTE: 0.4 /ΜL
MONOCYTES RELATIVE PERCENT: 6 %
NEUTROPHILS ABSOLUTE: 4.6 /ΜL
NEUTROPHILS RELATIVE PERCENT: 66 %
PLATELET # BLD: 326 K/ΜL
PMV BLD AUTO: NORMAL FL
POTASSIUM SERPL-SCNC: 4.4 MMOL/L
RBC # BLD: 4.62 10^6/ΜL
REAL-LDL SIZE PATTERN: 21.3
SMALL LDL PARTICLE, NMR: <90
SODIUM BLD-SCNC: 142 MMOL/L
TOTAL HDL-C DIRECT: 96
TOTAL PROTEIN: 7.3
TRIGLYCERIDES: 88
WBC # BLD: 7 10^3/ML

## 2022-03-09 ENCOUNTER — OFFICE VISIT (OUTPATIENT)
Dept: INTERNAL MEDICINE CLINIC | Age: 72
End: 2022-03-09
Payer: MEDICARE

## 2022-03-09 VITALS
WEIGHT: 132.2 LBS | BODY MASS INDEX: 20.75 KG/M2 | OXYGEN SATURATION: 97 % | HEART RATE: 73 BPM | HEIGHT: 67 IN | DIASTOLIC BLOOD PRESSURE: 70 MMHG | SYSTOLIC BLOOD PRESSURE: 110 MMHG

## 2022-03-09 DIAGNOSIS — M81.0 AGE-RELATED OSTEOPOROSIS WITHOUT CURRENT PATHOLOGICAL FRACTURE: ICD-10-CM

## 2022-03-09 DIAGNOSIS — R25.2 MUSCLE CRAMP: ICD-10-CM

## 2022-03-09 DIAGNOSIS — E55.9 VITAMIN D DEFICIENCY: ICD-10-CM

## 2022-03-09 DIAGNOSIS — E04.1 THYROID NODULE: ICD-10-CM

## 2022-03-09 DIAGNOSIS — G20 PARKINSON'S DISEASE (HCC): Primary | ICD-10-CM

## 2022-03-09 PROBLEM — G20.A1 PARKINSON'S DISEASE: Status: ACTIVE | Noted: 2022-03-09

## 2022-03-09 PROCEDURE — 4040F PNEUMOC VAC/ADMIN/RCVD: CPT | Performed by: INTERNAL MEDICINE

## 2022-03-09 PROCEDURE — G8399 PT W/DXA RESULTS DOCUMENT: HCPCS | Performed by: INTERNAL MEDICINE

## 2022-03-09 PROCEDURE — 1123F ACP DISCUSS/DSCN MKR DOCD: CPT | Performed by: INTERNAL MEDICINE

## 2022-03-09 PROCEDURE — G8427 DOCREV CUR MEDS BY ELIG CLIN: HCPCS | Performed by: INTERNAL MEDICINE

## 2022-03-09 PROCEDURE — G8484 FLU IMMUNIZE NO ADMIN: HCPCS | Performed by: INTERNAL MEDICINE

## 2022-03-09 PROCEDURE — G8420 CALC BMI NORM PARAMETERS: HCPCS | Performed by: INTERNAL MEDICINE

## 2022-03-09 PROCEDURE — 99204 OFFICE O/P NEW MOD 45 MIN: CPT | Performed by: INTERNAL MEDICINE

## 2022-03-09 PROCEDURE — 1036F TOBACCO NON-USER: CPT | Performed by: INTERNAL MEDICINE

## 2022-03-09 PROCEDURE — 1090F PRES/ABSN URINE INCON ASSESS: CPT | Performed by: INTERNAL MEDICINE

## 2022-03-09 PROCEDURE — 3017F COLORECTAL CA SCREEN DOC REV: CPT | Performed by: INTERNAL MEDICINE

## 2022-03-09 RX ORDER — UBIDECARENONE 100 MG
100 CAPSULE ORAL DAILY
COMMUNITY

## 2022-03-09 RX ORDER — MAGNESIUM GLUCONATE 27 MG(500)
500 TABLET ORAL 2 TIMES DAILY
COMMUNITY

## 2022-03-09 RX ORDER — POTASSIUM GLUCONATE 595(99)MG
1 TABLET ORAL DAILY
COMMUNITY

## 2022-03-09 ASSESSMENT — PATIENT HEALTH QUESTIONNAIRE - PHQ9
SUM OF ALL RESPONSES TO PHQ QUESTIONS 1-9: 0
2. FEELING DOWN, DEPRESSED OR HOPELESS: 0
SUM OF ALL RESPONSES TO PHQ9 QUESTIONS 1 & 2: 0
1. LITTLE INTEREST OR PLEASURE IN DOING THINGS: 0
SUM OF ALL RESPONSES TO PHQ QUESTIONS 1-9: 0

## 2022-03-09 NOTE — PROGRESS NOTES
[place]  1950  03/09/22    SUBJECTIVE:    Parkinson's Disease - diagnosed 8/20 when she had developed bradykinesia, dysequilibrium, ataxia. She follows at [facility] with Dr Prashanth Solano, currently is on sinemet 1 tab TID which she started in November - she has significant noticed benefit with the med. She had been in a Phase 2 study for a SQ med to prevent the progression of Parkinson's, but this ending in August 2021 - she felt that this did provide benefit. She follows at [facility]. Vit D deficiency - pt was very low in vit D a number of years ago, was started on D3 5000 units which she has been taking QOD. Muscle cramps - pt taking magnesium with benefit for muscle cramps. Nephrolithiasis - pt with stones twice, developed severe flank pain, N/V. She was able to pass the stones spontaneously. Her last stone was 9/1. Osteoporosis - confirmed on DEXA 11/19, currently on calcium and vit D. Thyroid nodule - pt follows with endo once yearly for US. She did need a biopsy which was inconclusive. OBJECTIVE:    /70 (Site: Right Upper Arm, Position: Sitting, Cuff Size: Medium Adult)   Pulse 73   Ht 5' 7\" (1.702 m)   Wt 132 lb 3.2 oz (60 kg)   SpO2 97%   BMI 20.71 kg/m²     Physical Exam  Constitutional:       Appearance: She is well-developed. HENT:      Right Ear: Tympanic membrane, ear canal and external ear normal.      Left Ear: Tympanic membrane, ear canal and external ear normal.   Eyes:      General: Lids are normal. No scleral icterus. Extraocular Movements:      Right eye: No nystagmus. Left eye: No nystagmus. Conjunctiva/sclera: Conjunctivae normal.      Right eye: Right conjunctiva is not injected. Left eye: Left conjunctiva is not injected. Pupils: Pupils are equal, round, and reactive to light. Pupils are equal.   Neck:      Thyroid: No thyroid mass or thyromegaly. Vascular: Normal carotid pulses. No carotid bruit or JVD.       Trachea: Trachea normal. Cardiovascular:      Rate and Rhythm: Normal rate and regular rhythm. Pulses:           Dorsalis pedis pulses are 2+ on the right side and 2+ on the left side. Posterior tibial pulses are 2+ on the right side and 2+ on the left side. Heart sounds: S1 normal and S2 normal. No murmur heard. Pulmonary:      Breath sounds: Normal breath sounds. No wheezing, rhonchi or rales. Abdominal:      General: Bowel sounds are normal.      Palpations: Abdomen is soft. Abdomen is not rigid. There is no mass. Tenderness: There is no abdominal tenderness. There is no guarding. Hernia: No hernia is present. Lymphadenopathy:      Head:      Right side of head: No submental, submandibular, tonsillar, preauricular, posterior auricular or occipital adenopathy. Left side of head: No submental, submandibular, tonsillar, preauricular, posterior auricular or occipital adenopathy. Cervical: No cervical adenopathy. Neurological:      Mental Status: She is alert and oriented to person, place, and time. Cranial Nerves: No cranial nerve deficit. Sensory: No sensory deficit. Motor: No tremor. Deep Tendon Reflexes:      Reflex Scores:       Bicep reflexes are 2+ on the right side and 2+ on the left side. Patellar reflexes are 2+ on the right side and 2+ on the left side. Psychiatric:         Speech: Speech normal.         Behavior: Behavior normal.         Thought Content: Thought content normal.         Judgment: Judgment normal.         ASSESSMENT:    1. Parkinson's disease    2. Age-related osteoporosis without current pathological fracture    3. Vitamin D deficiency    4. Muscle cramp    5. Thyroid nodule        PLAN:    Ursula Hatfield was seen today for established new doctor.     Diagnoses and all orders for this visit:    Parkinson's disease - doing well with the sinemet; no change in mgmt    Age-related osteoporosis without current pathological fracture - recheck DEXA, but I suspect that pt would meet indication for fosamax  -     Cholecalciferol (VITAMIN D3) 125 MCG (5000 UT) TABS; Take 1 tablet by mouth every other day  -     DEXA BONE DENSITY AXIAL SKELETON; Future    Vitamin D deficiency - at goal, no change     Muscle cramp - improved with vit D    Thyroid nodule - follows with endo    Other orders  -     carbidopa-levodopa (SINEMET)  MG per tablet; 1 tablet 3 times daily

## 2022-03-30 ENCOUNTER — HOSPITAL ENCOUNTER (OUTPATIENT)
Dept: WOMENS IMAGING | Age: 72
Discharge: HOME | End: 2022-03-30
Payer: MEDICARE

## 2022-03-30 DIAGNOSIS — M81.0 AGE-RELATED OSTEOPOROSIS WITHOUT CURRENT PATHOLOGICAL FRACTURE: ICD-10-CM

## 2022-03-30 PROCEDURE — 77080 DXA BONE DENSITY AXIAL: CPT

## 2022-04-12 ENCOUNTER — OFFICE VISIT (OUTPATIENT)
Dept: INTERNAL MEDICINE CLINIC | Age: 72
End: 2022-04-12
Payer: MEDICARE

## 2022-04-12 VITALS
RESPIRATION RATE: 14 BRPM | SYSTOLIC BLOOD PRESSURE: 118 MMHG | OXYGEN SATURATION: 99 % | WEIGHT: 136.4 LBS | HEART RATE: 74 BPM | BODY MASS INDEX: 21.36 KG/M2 | DIASTOLIC BLOOD PRESSURE: 64 MMHG

## 2022-04-12 VITALS
DIASTOLIC BLOOD PRESSURE: 64 MMHG | WEIGHT: 136 LBS | BODY MASS INDEX: 21.35 KG/M2 | TEMPERATURE: 97.8 F | HEIGHT: 67 IN | HEART RATE: 74 BPM | SYSTOLIC BLOOD PRESSURE: 118 MMHG | OXYGEN SATURATION: 99 %

## 2022-04-12 DIAGNOSIS — Z00.00 INITIAL MEDICARE ANNUAL WELLNESS VISIT: Primary | ICD-10-CM

## 2022-04-12 DIAGNOSIS — G20 PARKINSON'S DISEASE (HCC): ICD-10-CM

## 2022-04-12 DIAGNOSIS — M81.0 AGE-RELATED OSTEOPOROSIS WITHOUT CURRENT PATHOLOGICAL FRACTURE: Primary | ICD-10-CM

## 2022-04-12 PROCEDURE — G8420 CALC BMI NORM PARAMETERS: HCPCS | Performed by: INTERNAL MEDICINE

## 2022-04-12 PROCEDURE — G8399 PT W/DXA RESULTS DOCUMENT: HCPCS | Performed by: INTERNAL MEDICINE

## 2022-04-12 PROCEDURE — 4040F PNEUMOC VAC/ADMIN/RCVD: CPT | Performed by: INTERNAL MEDICINE

## 2022-04-12 PROCEDURE — 99213 OFFICE O/P EST LOW 20 MIN: CPT | Performed by: INTERNAL MEDICINE

## 2022-04-12 PROCEDURE — 1090F PRES/ABSN URINE INCON ASSESS: CPT | Performed by: INTERNAL MEDICINE

## 2022-04-12 PROCEDURE — 1036F TOBACCO NON-USER: CPT | Performed by: INTERNAL MEDICINE

## 2022-04-12 PROCEDURE — 1123F ACP DISCUSS/DSCN MKR DOCD: CPT | Performed by: INTERNAL MEDICINE

## 2022-04-12 PROCEDURE — G8427 DOCREV CUR MEDS BY ELIG CLIN: HCPCS | Performed by: INTERNAL MEDICINE

## 2022-04-12 PROCEDURE — G0438 PPPS, INITIAL VISIT: HCPCS | Performed by: INTERNAL MEDICINE

## 2022-04-12 PROCEDURE — 3017F COLORECTAL CA SCREEN DOC REV: CPT | Performed by: INTERNAL MEDICINE

## 2022-04-12 RX ORDER — ALENDRONATE SODIUM 70 MG/1
70 TABLET ORAL
Qty: 4 TABLET | Refills: 5 | Status: SHIPPED | OUTPATIENT
Start: 2022-04-12

## 2022-04-12 SDOH — ECONOMIC STABILITY: FOOD INSECURITY: WITHIN THE PAST 12 MONTHS, YOU WORRIED THAT YOUR FOOD WOULD RUN OUT BEFORE YOU GOT MONEY TO BUY MORE.: NEVER TRUE

## 2022-04-12 SDOH — ECONOMIC STABILITY: FOOD INSECURITY: WITHIN THE PAST 12 MONTHS, THE FOOD YOU BOUGHT JUST DIDN'T LAST AND YOU DIDN'T HAVE MONEY TO GET MORE.: NEVER TRUE

## 2022-04-12 ASSESSMENT — LIFESTYLE VARIABLES
HOW OFTEN DURING THE LAST YEAR HAVE YOU FAILED TO DO WHAT WAS NORMALLY EXPECTED FROM YOU BECAUSE OF DRINKING: 0
HAS A RELATIVE, FRIEND, DOCTOR, OR ANOTHER HEALTH PROFESSIONAL EXPRESSED CONCERN ABOUT YOUR DRINKING OR SUGGESTED YOU CUT DOWN: 0
HOW OFTEN DURING THE LAST YEAR HAVE YOU BEEN UNABLE TO REMEMBER WHAT HAPPENED THE NIGHT BEFORE BECAUSE YOU HAD BEEN DRINKING: 0
HOW OFTEN DURING THE LAST YEAR HAVE YOU NEEDED AN ALCOHOLIC DRINK FIRST THING IN THE MORNING TO GET YOURSELF GOING AFTER A NIGHT OF HEAVY DRINKING: 0
HOW MANY STANDARD DRINKS CONTAINING ALCOHOL DO YOU HAVE ON A TYPICAL DAY: 1 OR 2
HOW OFTEN DURING THE LAST YEAR HAVE YOU HAD A FEELING OF GUILT OR REMORSE AFTER DRINKING: 0
HOW OFTEN DURING THE LAST YEAR HAVE YOU FOUND THAT YOU WERE NOT ABLE TO STOP DRINKING ONCE YOU HAD STARTED: 0
HAVE YOU OR SOMEONE ELSE BEEN INJURED AS A RESULT OF YOUR DRINKING: 0
HOW OFTEN DO YOU HAVE A DRINK CONTAINING ALCOHOL: 2-3 TIMES A WEEK

## 2022-04-12 ASSESSMENT — PATIENT HEALTH QUESTIONNAIRE - PHQ9
SUM OF ALL RESPONSES TO PHQ QUESTIONS 1-9: 0
SUM OF ALL RESPONSES TO PHQ9 QUESTIONS 1 & 2: 0
SUM OF ALL RESPONSES TO PHQ QUESTIONS 1-9: 0
1. LITTLE INTEREST OR PLEASURE IN DOING THINGS: 0
2. FEELING DOWN, DEPRESSED OR HOPELESS: 0

## 2022-04-12 ASSESSMENT — SOCIAL DETERMINANTS OF HEALTH (SDOH): HOW HARD IS IT FOR YOU TO PAY FOR THE VERY BASICS LIKE FOOD, HOUSING, MEDICAL CARE, AND HEATING?: NOT HARD AT ALL

## 2022-04-12 NOTE — PATIENT INSTRUCTIONS
Personalized Preventive Plan for Rebecca Phillips - 4/12/2022  Medicare offers a range of preventive health benefits. Some of the tests and screenings are paid in full while other may be subject to a deductible, co-insurance, and/or copay. Some of these benefits include a comprehensive review of your medical history including lifestyle, illnesses that may run in your family, and various assessments and screenings as appropriate. After reviewing your medical record and screening and assessments performed today your provider may have ordered immunizations, labs, imaging, and/or referrals for you. A list of these orders (if applicable) as well as your Preventive Care list are included within your After Visit Summary for your review. Other Preventive Recommendations:    · A preventive eye exam performed by an eye specialist is recommended every 1-2 years to screen for glaucoma; cataracts, macular degeneration, and other eye disorders. · A preventive dental visit is recommended every 6 months. · Try to get at least 150 minutes of exercise per week or 10,000 steps per day on a pedometer . · Order or download the FREE \"Exercise & Physical Activity: Your Everyday Guide\" from [employer]. Call 8-614.656.3704 or search [employer] online. · You need 4839-3796 mg of calcium and 9827-7664 IU of vitamin D per day. It is possible to meet your calcium requirement with diet alone, but a vitamin D supplement is usually necessary to meet this goal.  · When exposed to the sun, use a sunscreen that protects against both UVA and UVB radiation with an SPF of 30 or greater. Reapply every 2 to 3 hours or after sweating, drying off with a towel, or swimming. · Always wear a seat belt when traveling in a car. Always wear a helmet when riding a bicycle or motorcycle. Heart-Healthy Diet   Sodium, Fat, and Cholesterol Controlled Diet       What Is a Heart Healthy Diet?    A heart-healthy diet is one that limits sodium , certain types of fat , and cholesterol . This type of diet is recommended for:   People with any form of cardiovascular disease (eg, coronary heart disease , peripheral vascular disease , previous heart attack , previous stroke )   People with risk factors for cardiovascular disease, such as high blood pressure , high cholesterol , or diabetes   Anyone who wants to lower their risk of developing cardiovascular disease   Sodium    Sodium is a mineral found in many foods. In general, most people consume much more sodium than they need. Diets high in sodium can increase blood pressure and lead to edema (water retention). On a heart-healthy diet, you should consume no more than 2,300 mg (milligrams) of sodium per dayabout the amount in one teaspoon of table salt. The foods highest in sodium include table salt (about 50% sodium), processed foods, convenience foods, and preserved foods. Cholesterol    Cholesterol is a fat-like, waxy substance in your blood. Our bodies make some cholesterol. It is also found in animal products, with the highest amounts in fatty meat, egg yolks, whole milk, cheese, shellfish, and organ meats. On a heart-healthy diet, you should limit your cholesterol intake to less than 200 mg per day. It is normal and important to have some cholesterol in your bloodstream. But too much cholesterol can cause plaque to build up within your arteries, which can eventually lead to a heart attack or stroke. The two types of cholesterol that are most commonly referred to are:   Low-density lipoprotein (LDL) cholesterol  Also known as bad cholesterol, this is the cholesterol that tends to build up along your arteries. Bad cholesterol levels are increased by eating fats that are saturated or hydrogenated. Optimal level of this cholesterol is less than 100. Over 130 starts to get risky for heart disease.    High-density lipoprotein (HDL) cholesterol  Also known as good cholesterol, this type of cholesterol actually carries cholesterol away from your arteries and may, therefore, help lower your risk of having a heart attack. You want this level to be high (ideally greater than 60). It is a risk to have a level less than 40. You can raise this good cholesterol by eating olive oil, canola oil, avocados, or nuts. Exercise raises this level, too. Fat    Fat is calorie dense and packs a lot of calories into a small amount of food. Even though fats should be limited due to their high calorie content, not all fats are bad. In fact, some fats are quite healthful. Fat can be broken down into four main types. The good-for-you fats are:   Monounsaturated fat  found in oils such as olive and canola, avocados, and nuts and natural nut butters; can decrease cholesterol levels, while keeping levels of HDL cholesterol high   Polyunsaturated fat  found in oils such as safflower, sunflower, soybean, corn, and sesame; can decrease total cholesterol and LDL cholesterol   Omega-3 fatty acids  particularly those found in fatty fish (such as salmon, trout, tuna, mackerel, herring, and sardines); can decrease risk of arrhythmias, decrease triglyceride levels, and slightly lower blood pressure   The fats that you want to limit are:   Saturated fat  found in animal products, many fast foods, and a few vegetables; increases total blood cholesterol, including LDL levels   Animal fats that are saturated include: butter, lard, whole-milk dairy products, meat fat, and poultry skin   Vegetable fats that are saturated include: hydrogenated shortening, palm oil, coconut oil, cocoa butter   Hydrogenated or trans fat  found in margarine and vegetable shortening, most shelf stable snack foods, and fried foods; increases LDL and decreases HDL     It is generally recommended that you limit your total fat for the day to less than 30% of your total calories.  If you follow an 1800-calorie heart healthy diet, for example, this would mean 60 grams of fat or less per day. Saturated fat and trans fat in your diet raises your blood cholesterol the most, much more than dietary cholesterol does. For this reason, on a heart-healthy diet, less than 7% of your calories should come from saturated fat and ideally 0% from trans fat. On an 1800-calorie diet, this translates into less than 14 grams of saturated fat per day, leaving 46 grams of fat to come from mono- and polyunsaturated fats.    Food Choices on a Heart Healthy Diet   Food Category   Foods Recommended   Foods to Avoid   Grains   Breads and rolls without salted tops Most dry and cooked cereals Unsalted crackers and breadsticks Low-sodium or homemade breadcrumbs or stuffing All rice and pastas   Breads, rolls, and crackers with salted tops High-fat baked goods (eg, muffins, donuts, pastries) Quick breads, self-rising flour, and biscuit mixes Regular bread crumbs Instant hot cereals Commercially prepared rice, pasta, or stuffing mixes   Vegetables   Most fresh, frozen, and low-sodium canned vegetables Low-sodium and salt-free vegetable juices Canned vegetables if unsalted or rinsed   Regular canned vegetables and juices, including sauerkraut and pickled vegetables Frozen vegetables with sauces Commercially prepared potato and vegetable mixes   Fruits   Most fresh, frozen, and canned fruits All fruit juices   Fruits processed with salt or sodium   Milk   Nonfat or low-fat (1%) milk Nonfat or low-fat yogurt Cottage cheese, low-fat ricotta, cheeses labeled as low-fat and low-sodium   Whole milk Reduced-fat (2%) milk Malted and chocolate milk Full fat yogurt Most cheeses (unless low-fat and low salt) Buttermilk (no more than 1 cup per week)   Meats and Beans   Lean cuts of fresh or frozen beef, veal, lamb, or pork (look for the word loin) Fresh or frozen poultry without the skin Fresh or frozen fish and some shellfish Egg whites and egg substitutes (Limit whole eggs to three per week) Tofu Nuts or seeds (unsalted, dry-roasted), low-sodium peanut butter Dried peas, beans, and lentils   Any smoked, cured, salted, or canned meat, fish, or poultry (including jaeger, chipped beef, cold cuts, hot dogs, sausages, sardines, and anchovies) Poultry skins Breaded and/or fried fish or meats Canned peas, beans, and lentils Salted nuts   Fats and Oils   Olive oil and canola oil Low-sodium, low-fat salad dressings and mayonnaise   Butter, margarine, coconut and palm oils, jaeger fat   Snacks, Sweets, and Condiments   Low-sodium or unsalted versions of broths, soups, soy sauce, and condiments Pepper, herbs, and spices; vinegar, lemon, or lime juice Low-fat frozen desserts (yogurt, sherbet, fruit bars) Sugar, cocoa powder, honey, syrup, jam, and preserves Low-fat, trans-fat free cookies, cakes, and pies Dima and animal crackers, fig bars, joleen snaps   High-fat desserts Broth, soups, gravies, and sauces, made from instant mixes or other high-sodium ingredients Salted snack foods Canned olives Meat tenderizers, seasoning salt, and most flavored vinegars   Beverages   Low-sodium carbonated beverages Tea and coffee in moderation Soy milk   Commercially softened water   Suggestions   Make whole grains, fruits, and vegetables the base of your diet. Choose heart-healthy fats such as canola, olive, and flaxseed oil, and foods high in heart-healthy fats, such as nuts, seeds, soybeans, tofu, and fish. Eat fish at least twice per week; the fish highest in omega-3 fatty acids and lowest in mercury include salmon, herring, mackerel, sardines, and canned chunk light tuna. If you eat fish less than twice per week or have high triglycerides, talk to your doctor about taking fish oil supplements. Read food labels.    For products low in fat and cholesterol, look for fat free, low-fat, cholesterol free, saturated fat free, and trans fat freeAlso scan the Nutrition Facts Label, which lists saturated fat, trans fat, and cholesterol amounts. For products low in sodium, look for sodium free, very low sodium, low sodium, no added salt, and unsalted   Skip the salt when cooking or at the table; if food needs more flavor, get creative and try out different herbs and spices. Garlic and onion also add substantial flavor to foods. Trim any visible fat off meat and poultry before cooking, and drain the fat off after quan. Use cooking methods that require little or no added fat, such as grilling, boiling, baking, poaching, broiling, roasting, steaming, stir-frying, and sauting. Avoid fast food and convenience food. They tend to be high in saturated and trans fat and have a lot of added salt. Talk to a registered dietitian for individualized diet advice. Last Reviewed: March 2011 Gopi Lauren MS, MPH, RD   Updated: 3/29/2011   ·     Heart-Healthy Diet   Sodium, Fat, and Cholesterol Controlled Diet       What Is a Heart Healthy Diet? A heart-healthy diet is one that limits sodium , certain types of fat , and cholesterol . This type of diet is recommended for:   People with any form of cardiovascular disease (eg, coronary heart disease , peripheral vascular disease , previous heart attack , previous stroke )   People with risk factors for cardiovascular disease, such as high blood pressure , high cholesterol , or diabetes   Anyone who wants to lower their risk of developing cardiovascular disease   Sodium    Sodium is a mineral found in many foods. In general, most people consume much more sodium than they need. Diets high in sodium can increase blood pressure and lead to edema (water retention). On a heart-healthy diet, you should consume no more than 2,300 mg (milligrams) of sodium per dayabout the amount in one teaspoon of table salt. The foods highest in sodium include table salt (about 50% sodium), processed foods, convenience foods, and preserved foods.    Cholesterol    Cholesterol is a fat-like, waxy substance in your blood. Our bodies make some cholesterol. It is also found in animal products, with the highest amounts in fatty meat, egg yolks, whole milk, cheese, shellfish, and organ meats. On a heart-healthy diet, you should limit your cholesterol intake to less than 200 mg per day. It is normal and important to have some cholesterol in your bloodstream. But too much cholesterol can cause plaque to build up within your arteries, which can eventually lead to a heart attack or stroke. The two types of cholesterol that are most commonly referred to are:   Low-density lipoprotein (LDL) cholesterol  Also known as bad cholesterol, this is the cholesterol that tends to build up along your arteries. Bad cholesterol levels are increased by eating fats that are saturated or hydrogenated. Optimal level of this cholesterol is less than 100. Over 130 starts to get risky for heart disease. High-density lipoprotein (HDL) cholesterol  Also known as good cholesterol, this type of cholesterol actually carries cholesterol away from your arteries and may, therefore, help lower your risk of having a heart attack. You want this level to be high (ideally greater than 60). It is a risk to have a level less than 40. You can raise this good cholesterol by eating olive oil, canola oil, avocados, or nuts. Exercise raises this level, too. Fat    Fat is calorie dense and packs a lot of calories into a small amount of food. Even though fats should be limited due to their high calorie content, not all fats are bad. In fact, some fats are quite healthful. Fat can be broken down into four main types.    The good-for-you fats are:   Monounsaturated fat  found in oils such as olive and canola, avocados, and nuts and natural nut butters; can decrease cholesterol levels, while keeping levels of HDL cholesterol high   Polyunsaturated fat  found in oils such as safflower, sunflower, soybean, corn, and sesame; can decrease total cholesterol and LDL cholesterol   Omega-3 fatty acids  particularly those found in fatty fish (such as salmon, trout, tuna, mackerel, herring, and sardines); can decrease risk of arrhythmias, decrease triglyceride levels, and slightly lower blood pressure   The fats that you want to limit are:   Saturated fat  found in animal products, many fast foods, and a few vegetables; increases total blood cholesterol, including LDL levels   Animal fats that are saturated include: butter, lard, whole-milk dairy products, meat fat, and poultry skin   Vegetable fats that are saturated include: hydrogenated shortening, palm oil, coconut oil, cocoa butter   Hydrogenated or trans fat  found in margarine and vegetable shortening, most shelf stable snack foods, and fried foods; increases LDL and decreases HDL     It is generally recommended that you limit your total fat for the day to less than 30% of your total calories. If you follow an 1800-calorie heart healthy diet, for example, this would mean 60 grams of fat or less per day. Saturated fat and trans fat in your diet raises your blood cholesterol the most, much more than dietary cholesterol does. For this reason, on a heart-healthy diet, less than 7% of your calories should come from saturated fat and ideally 0% from trans fat. On an 1800-calorie diet, this translates into less than 14 grams of saturated fat per day, leaving 46 grams of fat to come from mono- and polyunsaturated fats.    Food Choices on a Heart Healthy Diet   Food Category   Foods Recommended   Foods to Avoid   Grains   Breads and rolls without salted tops Most dry and cooked cereals Unsalted crackers and breadsticks Low-sodium or homemade breadcrumbs or stuffing All rice and pastas   Breads, rolls, and crackers with salted tops High-fat baked goods (eg, muffins, donuts, pastries) Quick breads, self-rising flour, and biscuit mixes Regular bread crumbs Instant hot cereals Commercially prepared rice, pasta, or stuffing mixes Vegetables   Most fresh, frozen, and low-sodium canned vegetables Low-sodium and salt-free vegetable juices Canned vegetables if unsalted or rinsed   Regular canned vegetables and juices, including sauerkraut and pickled vegetables Frozen vegetables with sauces Commercially prepared potato and vegetable mixes   Fruits   Most fresh, frozen, and canned fruits All fruit juices   Fruits processed with salt or sodium   Milk   Nonfat or low-fat (1%) milk Nonfat or low-fat yogurt Cottage cheese, low-fat ricotta, cheeses labeled as low-fat and low-sodium   Whole milk Reduced-fat (2%) milk Malted and chocolate milk Full fat yogurt Most cheeses (unless low-fat and low salt) Buttermilk (no more than 1 cup per week)   Meats and Beans   Lean cuts of fresh or frozen beef, veal, lamb, or pork (look for the word loin) Fresh or frozen poultry without the skin Fresh or frozen fish and some shellfish Egg whites and egg substitutes (Limit whole eggs to three per week) Tofu Nuts or seeds (unsalted, dry-roasted), low-sodium peanut butter Dried peas, beans, and lentils   Any smoked, cured, salted, or canned meat, fish, or poultry (including jaeger, chipped beef, cold cuts, hot dogs, sausages, sardines, and anchovies) Poultry skins Breaded and/or fried fish or meats Canned peas, beans, and lentils Salted nuts   Fats and Oils   Olive oil and canola oil Low-sodium, low-fat salad dressings and mayonnaise   Butter, margarine, coconut and palm oils, jaeger fat   Snacks, Sweets, and Condiments   Low-sodium or unsalted versions of broths, soups, soy sauce, and condiments Pepper, herbs, and spices; vinegar, lemon, or lime juice Low-fat frozen desserts (yogurt, sherbet, fruit bars) Sugar, cocoa powder, honey, syrup, jam, and preserves Low-fat, trans-fat free cookies, cakes, and pies Dima and animal crackers, fig bars, joleen snaps   High-fat desserts Broth, soups, gravies, and sauces, made from instant mixes or other high-sodium ingredients Salted snack foods Canned olives Meat tenderizers, seasoning salt, and most flavored vinegars   Beverages   Low-sodium carbonated beverages Tea and coffee in moderation Soy milk   Commercially softened water   Suggestions   Make whole grains, fruits, and vegetables the base of your diet. Choose heart-healthy fats such as canola, olive, and flaxseed oil, and foods high in heart-healthy fats, such as nuts, seeds, soybeans, tofu, and fish. Eat fish at least twice per week; the fish highest in omega-3 fatty acids and lowest in mercury include salmon, herring, mackerel, sardines, and canned chunk light tuna. If you eat fish less than twice per week or have high triglycerides, talk to your doctor about taking fish oil supplements. Read food labels. For products low in fat and cholesterol, look for fat free, low-fat, cholesterol free, saturated fat free, and trans fat freeAlso scan the Nutrition Facts Label, which lists saturated fat, trans fat, and cholesterol amounts. For products low in sodium, look for sodium free, very low sodium, low sodium, no added salt, and unsalted   Skip the salt when cooking or at the table; if food needs more flavor, get creative and try out different herbs and spices. Garlic and onion also add substantial flavor to foods. Trim any visible fat off meat and poultry before cooking, and drain the fat off after quan. Use cooking methods that require little or no added fat, such as grilling, boiling, baking, poaching, broiling, roasting, steaming, stir-frying, and sauting. Avoid fast food and convenience food. They tend to be high in saturated and trans fat and have a lot of added salt. Talk to a registered dietitian for individualized diet advice. Last Reviewed: March 2011 Gopi Lauren MS, MPH, RD   Updated: 3/29/2011   ·     High-Fiber Diet     What Is Fiber? Dietary fiber is a form of carbohydrate found in plants that cannot be digested by humans.  All plants contain fiber, including fruits, vegetables, grains, and legumes. Fiber is often classified into two categories: soluble and insoluble. Soluble fiber draws water into the bowel and can help slow digestion. Examples of foods that are high in soluble fiber include oatmeal, oat bran, barley, legumes (eg, beans and peas), apples, and strawberries. Insoluble fiber speeds digestion and can add bulk to the stool. Examples of foods that are high in insoluble fiber include whole-wheat products, wheat bran, cauliflower, green beans, and potatoes. Why Follow a High-Fiber Diet? A high-fiber diet is often recommended to prevent and treat constipation , hemorrhoids , diverticulitis , and irritable bowel syndrome . Eating a high-fiber diet can also help improve your cholesterol levels, lower your risk of coronary heart disease , reduce your risk of type 2 diabetes , and lower your weight. For people with type 1 or 2 diabetes, a high-fiber diet can also help stabilize blood sugar levels. How Much Fiber Should I Eat? A high-fiber diet should contain  20-35 grams  of fiber a day. This is actually the amount recommended for the general adult population; however, most Americans eat only 15 grams of fiber per day. Digestion of Fiber   Eating a higher fiber diet than usual can take some getting used to by your body's digestive system. To avoid the side effects of sudden increases in dietary fiber (eg, gas, cramping, bloating, and diarrhea), increase fiber gradually and be sure to drink plenty of fluids every day. Tips for Increasing Fiber Intake   Whenever possible, choose whole grains over refined grains (eg, brown rice instead of white rice, whole-wheat bread instead of white bread). Include a variety of grains in your diet, such as wheat, rye, barley, oats, quinoa, and bulgur. Eat more vegetarian-based meals. Here are some ideas: black bean burgers, eggplant lasagna, and veggie tofu stir-hernandez.     Choose high-fiber snacks, such as fruits, popcorn, whole-grain crackers, and nuts. Make whole-grain cereal or whole-grain toast part of your daily breakfast regime. When eating out, whether ordering a sandwich or dinner, ask for extra vegetables. When baking, replace part of the white flour with whole-wheat flour. Whole-wheat flour is particularly easy to incorporate into a recipe. High-Fiber Diet Eating Guide   Food Category   Foods Recommended   Notes   Grains   Whole-grain breads, muffins, bagels, or gretta bread Rye bread Whole-wheat crackers or crisp breads Whole-grain or bran cereals Oatmeal, oat bran, or grits Wheat germ Whole-wheat pasta and brown rice   Read the ingredients list on food labels. Look for products that list \"whole\" as the first ingredient (eg, whole-wheat, whole oats). Choose cereals with at least 2 grams of fiber per serving. Vegetables   All vegetables, especially asparagus, bean sprouts, broccoli, [place] sprouts, cabbage, carrots, cauliflower, celery, corn, greens, green beans, green pepper, onions, peas, potatoes (with skin), snow peas, spinach, squash, sweet potatoes, tomatoes, zucchini   For maximum fiber intake, eat the peels of fruits and vegetablesjust be sure to wash them well first.   Fruits   All fruits, especially apples, berries, grapefruits, mangoes, nectarines, oranges, peaches, pears, dried fruits (figs, dates, prunes, raisins)   Choose raw fruits and vegetables over juice, cooked, or cannedraw fruit has more fiber. Dried fruit is also a good source of fiber. Milk   With the exception of yogurt containing inulin (a type of fiber), dairy foods provide little fiber. Add more fiber by topping your yogurt or cottage cheese with fresh fruit, whole grain or bran cereals, nuts, or seeds.    Meats and Beans   All beans and peas, especially Garbanzo beans, kidney beans, lentils, lima beans, split peas, and davey beans All nuts and seeds, especially almonds, peanuts, [place] nuts, cashews, peanut butter, walnuts, sesame and sunflower seeds All meat, poultry, fish, and eggs   Increase fiber in meat dishes by adding davey beans, kidney beans, black-eyed peas, bran, or oatmeal. If you are following a low-fat diet, use nuts and seeds only in moderation. Fats and Oils   All in moderation   Fats and oils do not provide fiber   Snacks, Sweets, and Condiments   Fruit Nuts Popcorn, whole-wheat pretzels, or trail mix made with dried fruits, nuts, and seeds Cakes, breads, and cookies made with oatmeal or whole-wheat flour   Most snack foods do not provide much fiber. Choose snacks with at least 2 grams of fiber per serving. Last Reviewed: March 2011 Jacinto Hudson MS, MPH, RD   Updated: 3/29/2011   ·     Keep Your Memory Eulice Rice       Many factors can affect your ability to remembera hectic lifestyle, aging, stress, chronic disease, and certain medicines. But, there are steps you can take to sharpen your mind and help preserve your memory. Challenge Your Brain   Regularly challenging your mind may help keeps it in top shape. Good mental exercises include:   Crossword puzzlesUse a dictionary if you need it; you will learn more that way. Brainteasers Try some! Crafts, such as wood working and sewing   Hobbies, such as gardening and building model airplanes   SocializingVisit old friends or join groups to meet new ones. Reading   Learning a new language   Taking a class, whether it be art history or ko chi   TravelingExperience the food, history, and culture of your destination   Learning to use a computer   Going to museums, the theater, or thought-provoking movies   Changing things in your daily life, such as reversing your pattern in the grocery store or brushing your teeth using your nondominant hand   Use Memory Aids   There is no need to remember every detail on your own.  These memory aids can help:   Calendars and day planners   Electronic organizers to store all sorts of helpful informationThese devices can \"beep\" to remind you of appointments. A book of days to record birthdays, anniversaries, and other occasions that occur on the same date every year   Detailed \"to-do\" lists and strategically placed sticky notes   Quick \"study\" sessionsBefore a gathering, review who will be there so their names will be fresh in your mind. Establish routinesFor example, keep your keys, wallet, and umbrella in the same place all the time or take medicine with your 8:00 AM glass of juice   Live a Healthy Life   Many actions that will keep your body strong will do the same for your mind. For example:   Talk to Your Doctor About Herbs and Supplements    Malnutrition and vitamin deficiencies can impair your mental function. For example, vitamin B12 deficiency can cause a range of symptoms, including confusion. But, what if your nutritional needs are being met? Can herbs and supplements still offer a benefit? Researchers have investigated a range of natural remedies, such as ginkgo , ginseng , and the supplement phosphatidylserine (PS). So far, though, the evidence is inconsistent as to whether these products can improve memory or thinking. If you are interested in taking herbs and supplements, talk to your doctor first because they may interact with other medicines that you are taking. Exercise Regularly    Among the many benefits of regular exercise are increased blood flow to the brain and decreased risk of certain diseases that can interfere with memory function. One study found that even moderate exercise has a beneficial effect. Examples of \"moderate\" exercise include:   Playing 18 holes of golf once a week, without a cart   Playing tennis twice a week   Walking one mile per day   Manage Stress    It can be tough to remember what is important when your mind is cluttered. Make time for relaxation. Choose activities that calm you down, and make it routine.    Manage Chronic Conditions    Side effects of high blood pressure , diabetes, and heart disease can interfere with mental function. Many of the lifestyle steps discussed here can help manage these conditions. Strive to eat a healthy diet, exercise regularly, get stress under control, and follow your doctor's advice for your condition. Minimize Medications    Talk to your doctor about the medicines that you take. Some may be unnecessary. Also, healthy lifestyle habits may lower the need for certain drugs. Last Reviewed: April 2010 Renee Conway MD   Updated: 4/13/2010   ·     [address]       As we get older, changes in balance, gait, strength, vision, hearing, and cognition make even the most youthful senior more prone to accidents. Falls are one of the leading health risks for older people. This increased risk of falling is related to:   Aging process (eg, decreased muscle strength, slowed reflexes)   Higher incidence of chronic health problems (eg, arthritis, diabetes) that may limit mobility, agility or sensory awareness   Side effects of medicine (eg, dizziness, blurred vision)especially medicines like prescription pain medicines and drugs used to treat mental health conditions   Depending on the brittleness of your bones, the consequences of a fall can be serious and long lasting. Home Life   Research by the Association of Aging [facility]) shows that some home accidents among older adults can be prevented by making simple lifestyle changes and basic modifications and repairs to the home environment. Here are some lifestyle changes that experts recommend:   Have your hearing and vision checked regularly. Be sure to wear prescription glasses that are right for you. Speak to your doctor or pharmacist about the possible side effects of your medicines. A number of medicines can cause dizziness. If you have problems with sleep, talk to your doctor. Limit your intake of alcohol.    If necessary, use a cane or walker to help maintain your balance. Wear supportive, rubber-soled shoes, even at home. If you live in a region that gets wintry weather, you may want to put special cleats on your shoes to prevent you from slipping on the snow and ice. Exercise regularly to help maintain muscle tone, agility, and balance. Always hold the banister when going up or down stairs. Also, use  bars when getting in or out of the bath or shower, or using the toilet. To avoid dizziness, get up slowly from a lying down position. Sit up first, dangling your legs for a minute or two before rising to a standing position. Overall Home Safety Check   According to the Consumer Product Safety Commision's \"Older Consumer Home Safety Checklist,\" it is important to check for potential hazards in each room. And remember, proper lighting is an essential factor in home safety. If you cannot see clearly, you are more likely to fall. Important questions to ask yourself include:   Are lamp, electric, extension, and telephone cords placed out of the flow of traffic and maintained in good condition? Have frayed cords been replaced? Are all small rugs and runners slip resistant? If not, you can secure them to the floor with a special double-sided carpet tape. Are smoke detectors properly locatedone on every floor of your home and one outside of every sleeping area? Are they in good working order? Are batteries replaced at least once a year? Do you have a well-maintained carbon monoxide detector outside every sleeping are in your home? Does your furniture layout leave plenty of space to maneuver between and around chairs, tables, beds, and sofas? Are hallways, stairs and passages between rooms well lit? Can you reach a lamp without getting out of bed? Are floor surfaces well maintained? Shag rugs, high-pile carpeting, tile floors, and polished wood floors can be particularly slippery.  Stairs should always have handrails and be carpeted or fitted with a non-skid tread. Is your telephone easily reachable. Is the cord safely tucked away? Room by Room   According to the Association of Aging, bathrooms and issac are the two most potentially hazardous rooms in your home. In the Kitchen    Be sure your stove is in proper working order and always make sure burners and the oven are off before you go out or go to sleep. Keep pots on the back burners, turn handles away from the front of the stove, and keep stove clean and free of grease build-up. Kitchen ventilation systems and range exhausts should be working properly. Keep flammable objects such as towels and pot holders away from the cooking area except when in use. Make sure kitchen curtains are tied back. Move cords and appliances away from the sink and hot surfaces. If extension cords are needed, install wiring guides so they do not hang over the sink, range, or working areas. Look for coffee pots, kettles and toaster ovens with automatic shut-offs. Keep a mop handy in the kitchen so you can wipe up spills instantly. You should also have a small fire extinguisher. Arrange your kitchen with frequently used items on lower shelves to avoid the need to stand on a stepstool to reach them. Make sure countertops are well-lit to avoid injuries while cutting and preparing food. In the Bathroom    Use a non-slip mat or decals in the tub and shower, since wet, soapy tile or porcelain surfaces are extremely slippery. Make sure bathroom rugs are non-skid or tape them firmly to the floor. Bathtubs should have at least one, preferably two, grab bars, firmly attached to structural supports in the wall. (Do not use built-in soap holders or glass shower doors as grab bars.)    Tub seats fitted with non-slip material on the legs allow you to wash sitting down. For people with limited mobility, bathtub transfer benches allow you to slide safely into the tub.     Raised toilet seats and toilet safety rails are helpful for those with knee or hip problems. In the [facility]    Make sure you use a nightlight and that the area around your bed is clear of potential obstacles. Be careful with electric blankets and never go to sleep with a heating pad, which can cause serious burns even if on a low setting. Use fire-resistant mattress covers and pillows, and NEVER smoke in bed. Keep a phone next to the bed that is programmed to dial 911 at the push of a button. If you have a chronic condition, you may want to sign on with an automatic call-in service. Typically the system includes a small pendant that connects directly to an emergency medical voice-response system. You should also make arrangements to stay in contact with someonefriend, neighbor, family memberon a regular schedule. Fire Prevention   According to the "[employer]. (Smoke Alarms for Every) [address] [place] [address], senior citizens are one of the two highest risk groups for death and serious injuries due to residential fires. When cooking, wear short-sleeved items, never a bulky long-sleeved robe. The [facility]'s Safety Checklist for Older Consumers emphasizes the importance of checking basements, garages, workshops and storage areas for fire hazards, such as volatile liquids, piles of old rags or clothing and overloaded circuits. Never smoke in bed or when lying down on a couch or recliner chair. Small portable electric or kerosene heaters are responsible for many home fires and should be used cautiously if at all. If you do use one, be sure to keep them away from flammable materials. In case of fire, make sure you have a pre-established emergency exit plan. Have a professional check your fireplace and other fuel-burning appliances yearly. [employer]   Baby boomers entering the worrell years will continue to see the development of new products to help older adults live safely and independently in spite of age-related changes. Making Life More Livable  , by Felipa Carlson, lists over 1,000 products for \"living well in the mature years,\" such as bathing and mobility aids, household security devices, ergonomically designed knives and peelers, and faucet valves and knobs for temperature control. Medical supply stores and organizations are good sources of information about products that improve your quality of life and insure your safety.      Last Reviewed: November 2009 Starr Young MD   Updated: 3/7/2011     ·

## 2022-04-12 NOTE — PROGRESS NOTES
Medicare Annual Wellness Visit    Leopold Jack is here for Medicare AWV    Assessment & Plan   Initial Medicare annual wellness visit      Recommendations for Preventive Services Due: see orders and patient instructions/AVS.  Recommended screening schedule for the next 5-10 years is provided to the patient in written form: see Patient Instructions/AVS.     No follow-ups on file. Subjective       Patient's complete Health Risk Assessment and screening values have been reviewed and are found in Flowsheets. The following problems were reviewed today and where indicated follow up appointments were made and/or referrals ordered. Positive Risk Factor Screenings with Interventions:        Alcohol Screening:  AUDIT Total Score: 3    A score of 8 or more is associated with harmful or hazardous drinking. A score of 13 or more in women, and 15 or more in men, is likely to indicate alcohol dependence. Substance Abuse - Alcohol Interventions:  patient is not ready to change his/her alcohol consumption behavior at this time        General Health and ACP:  General  In general, how would you say your health is?: Very Good  In the past 7 days, have you experienced any of the following: New or Increased Pain, New or Increased Fatigue, Loneliness, Social Isolation, Stress or Anger?: No  Do you get the social and emotional support that you need?: Yes  Do you have a Living Will?: Yes    Advance Directives     Power of  Living Will ACP-Advance Directive ACP-Power of     Not on File Not on File Not on File Not on File      General Health Risk Interventions:  · No Living Will: ACP documents already completed- patient asked to provide copy to the office              Objective   Vitals:    04/12/22 0949   BP: 118/64   Pulse: 74   Temp: 97.8 °F (36.6 °C)   SpO2: 99%   Weight: 136 lb (61.7 kg)   Height: 5' 7\" (1.702 m)      Body mass index is 21.3 kg/m².                No Known Allergies  Prior to Visit Medications

## 2022-04-12 NOTE — PROGRESS NOTES
Mert Odom  1950  04/12/22    SUBJECTIVE:    Pt continues to follow with PT for the PD and her ambulation. She tends to carrying most of her weigth on her rigth leg. She is working on balance. She has been doing pelvic floor exercises, and will be following with urogyn on [place]. OBJECTIVE:    /64   Pulse 74   Resp 14   Wt 136 lb 6.4 oz (61.9 kg)   SpO2 99%   BMI 21.36 kg/m²     Physical Exam    ASSESSMENT:    1. Age-related osteoporosis without current pathological fracture    2. Parkinson's disease        PLAN:    Ana Morgan was seen today for 1 month follow-up. Diagnoses and all orders for this visit:    Age-related osteoporosis without current pathological fracture - strongly encourage fosamax given her severe osteoporosis and significant risk for falls given PD. She is hesitant but will consider    Parkinson's disease - cont with PT; on sinemet    Other orders  -     alendronate (FOSAMAX) 70 MG tablet;  Take 1 tablet by mouth every 7 days    Encourage shingles vaccine, tdap

## 2022-09-19 ENCOUNTER — INITIAL CONSULT (OUTPATIENT)
Dept: OBGYN | Age: 72
End: 2022-09-19
Payer: MEDICARE

## 2022-09-19 VITALS — BODY MASS INDEX: 20.52 KG/M2 | WEIGHT: 131 LBS | SYSTOLIC BLOOD PRESSURE: 132 MMHG | DIASTOLIC BLOOD PRESSURE: 79 MMHG

## 2022-09-19 DIAGNOSIS — N81.11 CYSTOCELE, MIDLINE: Primary | ICD-10-CM

## 2022-09-19 PROCEDURE — G8399 PT W/DXA RESULTS DOCUMENT: HCPCS | Performed by: OBSTETRICS & GYNECOLOGY

## 2022-09-19 PROCEDURE — 1090F PRES/ABSN URINE INCON ASSESS: CPT | Performed by: OBSTETRICS & GYNECOLOGY

## 2022-09-19 PROCEDURE — G8420 CALC BMI NORM PARAMETERS: HCPCS | Performed by: OBSTETRICS & GYNECOLOGY

## 2022-09-19 PROCEDURE — 1036F TOBACCO NON-USER: CPT | Performed by: OBSTETRICS & GYNECOLOGY

## 2022-09-19 PROCEDURE — 1123F ACP DISCUSS/DSCN MKR DOCD: CPT | Performed by: OBSTETRICS & GYNECOLOGY

## 2022-09-19 PROCEDURE — 3017F COLORECTAL CA SCREEN DOC REV: CPT | Performed by: OBSTETRICS & GYNECOLOGY

## 2022-09-19 PROCEDURE — G8427 DOCREV CUR MEDS BY ELIG CLIN: HCPCS | Performed by: OBSTETRICS & GYNECOLOGY

## 2022-09-19 PROCEDURE — 99203 OFFICE O/P NEW LOW 30 MIN: CPT | Performed by: OBSTETRICS & GYNECOLOGY

## 2022-09-19 NOTE — PROGRESS NOTES
9/19/22    Mack Burns  1950    Chief Complaint   Patient presents with    New Patient     Pt referred by Dr Quincy Radford for vaginal prolapse. Saw a dr in [place] at first they sent her for pelvic floor therapy and had pap smear. Pt saw [facility] for regular kidney check up and she mentioned issue to them. Budge started about 12-9 months ago. Started pelvic floor therapy last winter. No leakage. Pt had urgency but with the pelvic floor therapy it has gotten better.          Mack Burns is a 73 y.o. female who presents today for evaluation of complaints as above    Past Medical History:   Diagnosis Date    Kidney stone        Past Surgical History:   Procedure Laterality Date    BREAST BIOPSY      CATARACT REMOVAL Bilateral 09/2020    EYE MUSCLE SURGERY      childhood       Social History     Tobacco Use    Smoking status: Never    Smokeless tobacco: Never   Vaping Use    Vaping Use: Never used   Substance Use Topics    Alcohol use: Not Currently     Alcohol/week: 2.0 standard drinks     Types: 2 Standard drinks or equivalent per week    Drug use: No       Family History   Problem Relation Age of Onset    Cancer Mother         uterine    Cancer Father         leukemia    Coronary Art Dis Father     Heart Disease Sister     Other Sister         heart spasms    Cancer Sister         melanoma    Heart Attack Sister     Other Sister         heart spasms    Seizures Brother     Epilepsy Brother     No Known Problems Brother        Current Outpatient Medications   Medication Sig Dispense Refill    alendronate (FOSAMAX) 70 MG tablet Take 1 tablet by mouth every 7 days 4 tablet 5    Calcium Acetate, Phos Binder, (CALCIUM ACETATE PO) Take 1 tablet by mouth Daily with supper      coenzyme Q10 100 MG CAPS capsule Take 100 mg by mouth daily      Probiotic Product (HEALTHY COLON PO) Take by mouth      magnesium gluconate (MAGONATE) 500 MG tablet Take 500 mg by mouth 2 times daily      magnesium hydroxide (MILK OF MAGNESIA) 400 MG/5ML suspension Take by mouth daily as needed for Constipation      Menaquinone-7 (VITAMIN K2 PO) Take by mouth daily      Cod Liver Oil 10 MINIM CAPS 1 tablet by NOT APPLICABLE route daily      Cholecalciferol (VITAMIN D3) 125 MCG (5000 UT) TABS Take 1 tablet by mouth every other day 30 tablet 11    carbidopa-levodopa (SINEMET)  MG per tablet 1 tablet 3 times daily 90 tablet 3    ibuprofen (ADVIL;MOTRIN) 600 MG tablet Take 1 tablet by mouth every 8 hours as needed for Pain 30 tablet 0     No current facility-administered medications for this visit. No Known Allergies        Immunization History   Administered Date(s) Administered    COVID-19, PFIZER Bivalent BOOSTER, (age 12y+), IM, 30 mcg/0.3 mL dose 2022    COVID-19, PFIZER PURPLE top, DILUTE for use, (age 15 y+), 30mcg/0.3mL 2021, 2021, 2021    Pneumococcal Polysaccharide ([id number]) 10/01/2017       Review of Systems  All other systems reviewed and are negative    /79   Wt 131 lb (59.4 kg)   BMI 20.52 kg/m²     Physical Exam  Atrophy noted. Grade 2 midline cystocele noted. No pelvic masses. No results found for this visit on 22. ASSESSMENT AND PLAN   Diagnosis Orders   1. Cystocele, midline        At this point the patient will continue with her pelvic floor physical therapy. She does this on her own. She declined surgical evaluation. I offered her a trial of a pessary which she will call or return if she decides to pursue    Return if symptoms worsen or fail to improve.     Babita Ligth MD

## 2022-10-31 ENCOUNTER — COMMUNITY OUTREACH (OUTPATIENT)
Dept: INTERNAL MEDICINE CLINIC | Age: 72
End: 2022-10-31

## 2022-10-31 NOTE — PROGRESS NOTES
Patient's HM shows they are overdue for Mammogram Screening[facility]  [facility]. Hm updated with most recent mamm.  2022

## 2023-01-31 ENCOUNTER — OFFICE VISIT (OUTPATIENT)
Dept: INTERNAL MEDICINE CLINIC | Age: 73
End: 2023-01-31
Payer: MEDICARE

## 2023-01-31 VITALS
DIASTOLIC BLOOD PRESSURE: 68 MMHG | BODY MASS INDEX: 19.01 KG/M2 | HEART RATE: 80 BPM | OXYGEN SATURATION: 98 % | SYSTOLIC BLOOD PRESSURE: 118 MMHG | WEIGHT: 121.4 LBS | RESPIRATION RATE: 12 BRPM

## 2023-01-31 DIAGNOSIS — R19.7 DIARRHEA OF PRESUMED INFECTIOUS ORIGIN: Primary | ICD-10-CM

## 2023-01-31 DIAGNOSIS — R11.2 NAUSEA AND VOMITING, UNSPECIFIED VOMITING TYPE: ICD-10-CM

## 2023-01-31 DIAGNOSIS — R19.7 DIARRHEA OF PRESUMED INFECTIOUS ORIGIN: ICD-10-CM

## 2023-01-31 LAB
A/G RATIO: 1.8 (ref 1.1–2.2)
ALBUMIN SERPL-MCNC: 4.4 G/DL (ref 3.4–5)
ALP BLD-CCNC: 49 U/L (ref 40–129)
ALT SERPL-CCNC: 8 U/L (ref 10–40)
ANION GAP SERPL CALCULATED.3IONS-SCNC: 10 MMOL/L (ref 3–16)
AST SERPL-CCNC: 27 U/L (ref 15–37)
BASOPHILS ABSOLUTE: 0.1 K/UL (ref 0–0.2)
BASOPHILS RELATIVE PERCENT: 0.8 %
BILIRUB SERPL-MCNC: 0.7 MG/DL (ref 0–1)
BUN BLDV-MCNC: 29 MG/DL (ref 7–20)
CALCIUM SERPL-MCNC: 9.4 MG/DL (ref 8.3–10.6)
CHLORIDE BLD-SCNC: 102 MMOL/L (ref 99–110)
CO2: 27 MMOL/L (ref 21–32)
CREAT SERPL-MCNC: 0.6 MG/DL (ref 0.6–1.2)
EOSINOPHILS ABSOLUTE: 0.1 K/UL (ref 0–0.6)
EOSINOPHILS RELATIVE PERCENT: 1.9 %
GFR SERPL CREATININE-BSD FRML MDRD: >60 ML/MIN/{1.73_M2}
GLUCOSE BLD-MCNC: 93 MG/DL (ref 70–99)
HCT VFR BLD CALC: 42.7 % (ref 36–48)
HEMOGLOBIN: 13.9 G/DL (ref 12–16)
LYMPHOCYTES ABSOLUTE: 2.1 K/UL (ref 1–5.1)
LYMPHOCYTES RELATIVE PERCENT: 30.7 %
MCH RBC QN AUTO: 30 PG (ref 26–34)
MCHC RBC AUTO-ENTMCNC: 32.5 G/DL (ref 31–36)
MCV RBC AUTO: 92.4 FL (ref 80–100)
MONOCYTES ABSOLUTE: 0.6 K/UL (ref 0–1.3)
MONOCYTES RELATIVE PERCENT: 8.7 %
NEUTROPHILS ABSOLUTE: 3.9 K/UL (ref 1.7–7.7)
NEUTROPHILS RELATIVE PERCENT: 57.9 %
PDW BLD-RTO: 13.6 % (ref 12.4–15.4)
PLATELET # BLD: 296 K/UL (ref 135–450)
PMV BLD AUTO: 9.9 FL (ref 5–10.5)
POTASSIUM SERPL-SCNC: 4.4 MMOL/L (ref 3.5–5.1)
RBC # BLD: 4.62 M/UL (ref 4–5.2)
SODIUM BLD-SCNC: 139 MMOL/L (ref 136–145)
TOTAL PROTEIN: 6.8 G/DL (ref 6.4–8.2)
WBC # BLD: 6.7 K/UL (ref 4–11)

## 2023-01-31 PROCEDURE — G8427 DOCREV CUR MEDS BY ELIG CLIN: HCPCS | Performed by: INTERNAL MEDICINE

## 2023-01-31 PROCEDURE — G8420 CALC BMI NORM PARAMETERS: HCPCS | Performed by: INTERNAL MEDICINE

## 2023-01-31 PROCEDURE — 1036F TOBACCO NON-USER: CPT | Performed by: INTERNAL MEDICINE

## 2023-01-31 PROCEDURE — G8484 FLU IMMUNIZE NO ADMIN: HCPCS | Performed by: INTERNAL MEDICINE

## 2023-01-31 PROCEDURE — G8399 PT W/DXA RESULTS DOCUMENT: HCPCS | Performed by: INTERNAL MEDICINE

## 2023-01-31 PROCEDURE — 1090F PRES/ABSN URINE INCON ASSESS: CPT | Performed by: INTERNAL MEDICINE

## 2023-01-31 PROCEDURE — 36415 COLL VENOUS BLD VENIPUNCTURE: CPT | Performed by: INTERNAL MEDICINE

## 2023-01-31 PROCEDURE — 1123F ACP DISCUSS/DSCN MKR DOCD: CPT | Performed by: INTERNAL MEDICINE

## 2023-01-31 PROCEDURE — 99214 OFFICE O/P EST MOD 30 MIN: CPT | Performed by: INTERNAL MEDICINE

## 2023-01-31 PROCEDURE — 3017F COLORECTAL CA SCREEN DOC REV: CPT | Performed by: INTERNAL MEDICINE

## 2023-01-31 RX ORDER — ONDANSETRON 4 MG/1
4 TABLET, FILM COATED ORAL 3 TIMES DAILY PRN
Qty: 30 TABLET | Refills: 0 | Status: SHIPPED | OUTPATIENT
Start: 2023-01-31

## 2023-01-31 ASSESSMENT — PATIENT HEALTH QUESTIONNAIRE - PHQ9
SUM OF ALL RESPONSES TO PHQ QUESTIONS 1-9: 0
2. FEELING DOWN, DEPRESSED OR HOPELESS: 0
1. LITTLE INTEREST OR PLEASURE IN DOING THINGS: 0
SUM OF ALL RESPONSES TO PHQ9 QUESTIONS 1 & 2: 0
SUM OF ALL RESPONSES TO PHQ QUESTIONS 1-9: 0

## 2023-01-31 NOTE — PROGRESS NOTES
Angel Bun  1950  01/31/23    SUBJECTIVE:      Three weeks ago pt developed diarrhea x2 which then resolved with out treatment. 1/23 she ate out, and afterwards developed N/V and the next day she had diarrhea >10 times productive of black soft and watery stool without blood. She did not have abd pain,  F/C. These symptoms resolved, but then Monday she had a large breakfast which was followed by nausea, vomiting, and diarrhea. She vomited once ~7 hours after her breakfast productive of liquid without blood. The diarrhea occurring every 10-15 minutes productive productive of liquid black foul smelling stool without blood. Pt denies F/C, abd pain, recent abx. She did not tolerate much fluids yesterday, but has tolerated a small amount today. OBJECTIVE:    /68   Pulse 80   Resp 12   Wt 121 lb 6.4 oz (55.1 kg)   SpO2 98%   BMI 19.01 kg/m²     Physical Exam  Constitutional:       Appearance: She is well-developed. Eyes:      General: No scleral icterus. Conjunctiva/sclera: Conjunctivae normal.   Neck:      Thyroid: No thyromegaly. Trachea: No tracheal deviation. Cardiovascular:      Rate and Rhythm: Normal rate and regular rhythm. Heart sounds: No murmur heard. No friction rub. No gallop. Pulmonary:      Effort: No respiratory distress. Breath sounds: No wheezing or rales. Abdominal:      General: Bowel sounds are normal. There is no distension. Palpations: Abdomen is soft. There is no hepatomegaly or mass. Tenderness: There is no abdominal tenderness. There is no guarding or rebound. Musculoskeletal:      Cervical back: Neck supple. Lymphadenopathy:      Cervical: No cervical adenopathy. Skin:     Nails: There is no clubbing. Neurological:      Mental Status: She is alert and oriented to person, place, and time. Psychiatric:         Behavior: Behavior normal.         Judgment: Judgment normal.       ASSESSMENT:    1.  Diarrhea of presumed infectious origin    2. Nausea and vomiting, unspecified vomiting type        PLAN:    Rosaura Isabel was seen today for diarrhea and nausea & vomiting. Diagnoses and all orders for this visit:    Diarrhea of presumed infectious origin - unclear if this is 3 distinct illnesses or one ill relapsing then improving. She is improving today. I will check labs and, if she continues to have diarrhea, check stool studies. If the diarrhea has resolved but recurs she will check stool studies then. Zofran for nausea. DDx includes infx, IBS, IBD  -     ondansetron (ZOFRAN) 4 MG tablet; Take 1 tablet by mouth 3 times daily as needed for Nausea or Vomiting  -     CBC with Auto Differential; Future  -     Comprehensive Metabolic Panel; Future  -     Fecal Leukocytes; Future  -     GI Bacterial Pathogens By PCR; Future  -     C DIFF TOXIN/ANTIGEN; Future    Nausea and vomiting, unspecified vomiting type  -     ondansetron (ZOFRAN) 4 MG tablet; Take 1 tablet by mouth 3 times daily as needed for Nausea or Vomiting  -     CBC with Auto Differential; Future  -     Comprehensive Metabolic Panel; Future  -     Fecal Leukocytes; Future  -     GI Bacterial Pathogens By PCR; Future  -     C DIFF TOXIN/ANTIGEN;  Future

## 2023-02-14 ENCOUNTER — TELEPHONE (OUTPATIENT)
Dept: INTERNAL MEDICINE CLINIC | Age: 73
End: 2023-02-14

## 2023-02-14 DIAGNOSIS — R19.7 DIARRHEA, UNSPECIFIED TYPE: Primary | ICD-10-CM

## 2023-02-14 NOTE — TELEPHONE ENCOUNTER
The patients  called in stating that she is having the vomiting and diarrhea episodes and said that they were told by you that if it happens again to reach back out to you . Please advise !

## 2023-02-17 ENCOUNTER — HOSPITAL ENCOUNTER (OUTPATIENT)
Age: 73
Setting detail: SPECIMEN
Discharge: HOME | End: 2023-02-17
Payer: MEDICARE

## 2023-02-17 DIAGNOSIS — R19.7 DIARRHEA OF PRESUMED INFECTIOUS ORIGIN: Primary | ICD-10-CM

## 2023-02-17 DIAGNOSIS — R11.2 NAUSEA AND VOMITING, UNSPECIFIED VOMITING TYPE: ICD-10-CM

## 2023-02-17 LAB
ASTROVIRUS PCR: NOT DETECTED
C CAYETANENSIS DNA SPEC QL NAA+PROBE: NOT DETECTED
C DIFF AG + TOXIN: NORMAL
CAMPY SP DNA.DIARRHEA STL QL NAA+PROBE: NOT DETECTED
CRYPTOSP DNA SPEC QL NAA+PROBE: NOT DETECTED
E COLI DNA SPEC QL NAA+PROBE: NOT DETECTED
E COLI ENTEROAGGREGATIVE PCR: NOT DETECTED
E COLI ENTEROPATHOGENIC PCR: NOT DETECTED
E COLI ENTEROTOXIGENIC PCR: NOT DETECTED
E COLI O157H7 DNA SPEC QL NAA+PROBE: NOT DETECTED
E HISTOLYT DNA SPEC QL NAA+PROBE: NOT DETECTED
EC STX1+STX2 + H7 FLIC SPEC NAA+PROBE: NOT DETECTED
G LAMBLIA DNA SPEC QL NAA+PROBE: NOT DETECTED
HADV DNA SPEC QL NAA+PROBE: NOT DETECTED
LACTOFERRIN, QUAL: NEGATIVE
NOROVIRUS RNA SPEC QL NAA+PROBE: NOT DETECTED
P SHIGELLOIDES DNA STL QL NAA+PROBE: NOT DETECTED
RV RNA SPEC QL NAA+PROBE: NOT DETECTED
SALMONELLA DNA SPEC QL NAA+PROBE: NOT DETECTED
SAPOVIRUS PCR: NOT DETECTED
SOURCE: NORMAL
V CHOLERAE DNA SPEC QL NAA+PROBE: NOT DETECTED
VIBRIO DNA SPEC NAA+PROBE: NOT DETECTED
YERSINIA DNA SPEC NAA+PROBE: NOT DETECTED

## 2023-02-17 PROCEDURE — 87324 CLOSTRIDIUM AG IA: CPT

## 2023-02-17 PROCEDURE — 83630 LACTOFERRIN FECAL (QUAL): CPT

## 2023-02-17 PROCEDURE — 87507 IADNA-DNA/RNA PROBE TQ 12-25: CPT

## 2023-02-17 PROCEDURE — 87449 NOS EACH ORGANISM AG IA: CPT

## 2023-05-16 ENCOUNTER — TELEMEDICINE (OUTPATIENT)
Dept: INTERNAL MEDICINE CLINIC | Age: 73
End: 2023-05-16
Payer: MEDICARE

## 2023-05-16 DIAGNOSIS — Z00.00 MEDICARE ANNUAL WELLNESS VISIT, SUBSEQUENT: Primary | ICD-10-CM

## 2023-05-16 PROCEDURE — 3017F COLORECTAL CA SCREEN DOC REV: CPT | Performed by: INTERNAL MEDICINE

## 2023-05-16 PROCEDURE — 1123F ACP DISCUSS/DSCN MKR DOCD: CPT | Performed by: INTERNAL MEDICINE

## 2023-05-16 PROCEDURE — G0439 PPPS, SUBSEQ VISIT: HCPCS | Performed by: INTERNAL MEDICINE

## 2023-05-16 RX ORDER — VITAMIN B COMPLEX
1 CAPSULE ORAL DAILY
COMMUNITY

## 2023-05-16 ASSESSMENT — PATIENT HEALTH QUESTIONNAIRE - PHQ9
SUM OF ALL RESPONSES TO PHQ QUESTIONS 1-9: 0
1. LITTLE INTEREST OR PLEASURE IN DOING THINGS: 0
SUM OF ALL RESPONSES TO PHQ QUESTIONS 1-9: 0
SUM OF ALL RESPONSES TO PHQ QUESTIONS 1-9: 0
SUM OF ALL RESPONSES TO PHQ9 QUESTIONS 1 & 2: 0
2. FEELING DOWN, DEPRESSED OR HOPELESS: 0
SUM OF ALL RESPONSES TO PHQ QUESTIONS 1-9: 0

## 2023-05-16 ASSESSMENT — LIFESTYLE VARIABLES
HOW OFTEN DO YOU HAVE A DRINK CONTAINING ALCOHOL: 2-3 TIMES A WEEK
HOW MANY STANDARD DRINKS CONTAINING ALCOHOL DO YOU HAVE ON A TYPICAL DAY: 1 OR 2

## 2023-05-16 NOTE — PATIENT INSTRUCTIONS
Personalized Preventive Plan for Pradeep English - 5/16/2023  Medicare offers a range of preventive health benefits. Some of the tests and screenings are paid in full while other may be subject to a deductible, co-insurance, and/or copay. Some of these benefits include a comprehensive review of your medical history including lifestyle, illnesses that may run in your family, and various assessments and screenings as appropriate. After reviewing your medical record and screening and assessments performed today your provider may have ordered immunizations, labs, imaging, and/or referrals for you. A list of these orders (if applicable) as well as your Preventive Care list are included within your After Visit Summary for your review. Other Preventive Recommendations:    A preventive eye exam performed by an eye specialist is recommended every 1-2 years to screen for glaucoma; cataracts, macular degeneration, and other eye disorders. A preventive dental visit is recommended every 6 months. Try to get at least 150 minutes of exercise per week or 10,000 steps per day on a pedometer . Order or download the FREE \"Exercise & Physical Activity: Your Everyday Guide\" from [employer]. Call 2-130.140.3008 or search [employer] online. You need 8879-9913 mg of calcium and 1340-5412 IU of vitamin D per day. It is possible to meet your calcium requirement with diet alone, but a vitamin D supplement is usually necessary to meet this goal.  When exposed to the sun, use a sunscreen that protects against both UVA and UVB radiation with an SPF of 30 or greater. Reapply every 2 to 3 hours or after sweating, drying off with a towel, or swimming. Always wear a seat belt when traveling in a car. Always wear a helmet when riding a bicycle or motorcycle.

## 2023-05-16 NOTE — PROGRESS NOTES
Medicare Annual Wellness Visit    Jose C Queen is here for Medicare AWV    Assessment & Plan   Medicare annual wellness visit, subsequent      Recommendations for Preventive Services Due: see orders and patient instructions/AVS.  Recommended screening schedule for the next 5-10 years is provided to the patient in written form: see Patient Instructions/AVS.     No follow-ups on file. Subjective       Patient's complete Health Risk Assessment and screening values have been reviewed and are found in Flowsheets. The following problems were reviewed today and where indicated follow up appointments were made and/or referrals ordered. *Patient states her computer is currently getting repaired and cannot do a virtual visit*  Positive Risk Factor Screenings with Interventions:    Fall Risk:  Do you feel unsteady or are you worried about falling? : (!) yes (has parkinsons, feels very strong)  2 or more falls in past year?: no  Fall with injury in past year?: no     Interventions:    Patient comments: states she will worry only because she has parkinson's disease. She states she feels \"very strong\" when ambulating. Patient declines any further evaluation or treatment            General HRA Questions:  Select all that apply: (!) Stress (children)    Stress Interventions:  Patient comments: states her children will cause her stress as she worries about them at times. Patient declined any further interventions or treatment                             Objective      Patient-Reported Vitals  No data recorded     Unable to obtain 3 vital signs due to patient not having equipment to take blood pressure/temperature. No Known Allergies  Prior to Visit Medications    Medication Sig Taking?  Authorizing Provider   b complex vitamins capsule Take 1 capsule by mouth daily Yes Historical Provider, MD   Calcium Acetate, Phos Binder, (CALCIUM ACETATE PO) Take 1 tablet by mouth Daily with supper Yes Historical Provider, MD

## 2023-08-21 ENCOUNTER — OFFICE VISIT (OUTPATIENT)
Dept: INTERNAL MEDICINE CLINIC | Age: 73
End: 2023-08-21
Payer: MEDICARE

## 2023-08-21 VITALS
DIASTOLIC BLOOD PRESSURE: 70 MMHG | WEIGHT: 118 LBS | OXYGEN SATURATION: 99 % | SYSTOLIC BLOOD PRESSURE: 108 MMHG | RESPIRATION RATE: 18 BRPM | HEART RATE: 83 BPM | BODY MASS INDEX: 18.48 KG/M2

## 2023-08-21 DIAGNOSIS — E78.00 HYPERCHOLESTEROLEMIA: ICD-10-CM

## 2023-08-21 DIAGNOSIS — K59.1 FUNCTIONAL DIARRHEA: Primary | ICD-10-CM

## 2023-08-21 DIAGNOSIS — K59.1 FUNCTIONAL DIARRHEA: ICD-10-CM

## 2023-08-21 LAB
DEPRECATED RDW RBC AUTO: 13.6 % (ref 12.4–15.4)
HCT VFR BLD AUTO: 36.4 % (ref 36–48)
HGB BLD-MCNC: 12.4 G/DL (ref 12–16)
MCH RBC QN AUTO: 31.2 PG (ref 26–34)
MCHC RBC AUTO-ENTMCNC: 34.2 G/DL (ref 31–36)
MCV RBC AUTO: 91.3 FL (ref 80–100)
PLATELET # BLD AUTO: 477 K/UL (ref 135–450)
PMV BLD AUTO: 8 FL (ref 5–10.5)
RBC # BLD AUTO: 3.99 M/UL (ref 4–5.2)
WBC # BLD AUTO: 15.6 K/UL (ref 4–11)

## 2023-08-21 PROCEDURE — G8399 PT W/DXA RESULTS DOCUMENT: HCPCS | Performed by: INTERNAL MEDICINE

## 2023-08-21 PROCEDURE — 3017F COLORECTAL CA SCREEN DOC REV: CPT | Performed by: INTERNAL MEDICINE

## 2023-08-21 PROCEDURE — 99214 OFFICE O/P EST MOD 30 MIN: CPT | Performed by: INTERNAL MEDICINE

## 2023-08-21 PROCEDURE — 1036F TOBACCO NON-USER: CPT | Performed by: INTERNAL MEDICINE

## 2023-08-21 PROCEDURE — G8419 CALC BMI OUT NRM PARAM NOF/U: HCPCS | Performed by: INTERNAL MEDICINE

## 2023-08-21 PROCEDURE — 1090F PRES/ABSN URINE INCON ASSESS: CPT | Performed by: INTERNAL MEDICINE

## 2023-08-21 PROCEDURE — 36415 COLL VENOUS BLD VENIPUNCTURE: CPT | Performed by: INTERNAL MEDICINE

## 2023-08-21 PROCEDURE — G8427 DOCREV CUR MEDS BY ELIG CLIN: HCPCS | Performed by: INTERNAL MEDICINE

## 2023-08-21 PROCEDURE — 1123F ACP DISCUSS/DSCN MKR DOCD: CPT | Performed by: INTERNAL MEDICINE

## 2023-08-22 ENCOUNTER — HOSPITAL ENCOUNTER (OUTPATIENT)
Age: 73
Setting detail: SPECIMEN
Discharge: HOME | End: 2023-08-22
Payer: MEDICARE

## 2023-08-22 DIAGNOSIS — R19.7 DIARRHEA, UNSPECIFIED TYPE: Primary | ICD-10-CM

## 2023-08-22 DIAGNOSIS — D72.829 LEUKOCYTOSIS, UNSPECIFIED TYPE: ICD-10-CM

## 2023-08-22 DIAGNOSIS — R19.7 DIARRHEA, UNSPECIFIED TYPE: ICD-10-CM

## 2023-08-22 DIAGNOSIS — E77.8 HYPOPROTEINEMIA (HCC): ICD-10-CM

## 2023-08-22 LAB
ALBUMIN SERPL-MCNC: 3.4 G/DL (ref 3.4–5)
ALBUMIN/GLOB SERPL: 1.4 {RATIO} (ref 1.1–2.2)
ALP SERPL-CCNC: 68 U/L (ref 40–129)
ALT SERPL-CCNC: 7 U/L (ref 10–40)
ANION GAP SERPL CALCULATED.3IONS-SCNC: 9 MMOL/L (ref 3–16)
AST SERPL-CCNC: 17 U/L (ref 15–37)
BASOPHILS ABSOLUTE: 0.1 K/CU MM
BASOPHILS RELATIVE PERCENT: 0.7 % (ref 0–1)
BILIRUB SERPL-MCNC: <0.2 MG/DL (ref 0–1)
BUN SERPL-MCNC: 20 MG/DL (ref 7–20)
CALCIUM SERPL-MCNC: 8.8 MG/DL (ref 8.3–10.6)
CHLORIDE SERPL-SCNC: 101 MMOL/L (ref 99–110)
CO2 SERPL-SCNC: 28 MMOL/L (ref 21–32)
CREAT SERPL-MCNC: 0.7 MG/DL (ref 0.6–1.2)
DIFFERENTIAL TYPE: ABNORMAL
EOSINOPHILS ABSOLUTE: 0.3 K/CU MM
EOSINOPHILS RELATIVE PERCENT: 2.1 % (ref 0–3)
GFR SERPLBLD CREATININE-BSD FMLA CKD-EPI: >60 ML/MIN/{1.73_M2}
GLUCOSE SERPL-MCNC: 94 MG/DL (ref 70–99)
HCT VFR BLD CALC: 34.3 % (ref 37–47)
HEMOGLOBIN: 11.1 GM/DL (ref 12.5–16)
IGA: 180 MG/DL (ref 69–382)
IGG,SERUM: 682 MG/DL (ref 723–1685)
IGM,SERUM: 67 MG/DL (ref 62–277)
IMMATURE NEUTROPHIL %: 0.7 % (ref 0–0.43)
LYMPHOCYTES ABSOLUTE: 3.2 K/CU MM
LYMPHOCYTES RELATIVE PERCENT: 21.5 % (ref 24–44)
MCH RBC QN AUTO: 30 PG (ref 27–31)
MCHC RBC AUTO-ENTMCNC: 32.4 % (ref 32–36)
MCV RBC AUTO: 92.7 FL (ref 78–100)
MONOCYTES ABSOLUTE: 1.7 K/CU MM
MONOCYTES RELATIVE PERCENT: 11.3 % (ref 0–4)
NUCLEATED RBC %: 0 %
PDW BLD-RTO: 13.2 % (ref 11.7–14.9)
PLATELET # BLD: 484 K/CU MM (ref 140–440)
PMV BLD AUTO: 9.7 FL (ref 7.5–11.1)
POTASSIUM SERPL-SCNC: 4.2 MMOL/L (ref 3.5–5.1)
PROT SERPL-MCNC: 5.9 G/DL (ref 6.4–8.2)
RBC # BLD: 3.7 M/CU MM (ref 4.2–5.4)
SEGMENTED NEUTROPHILS ABSOLUTE COUNT: 9.3 K/CU MM
SEGMENTED NEUTROPHILS RELATIVE PERCENT: 63.7 % (ref 36–66)
SODIUM SERPL-SCNC: 138 MMOL/L (ref 136–145)
TOTAL IMMATURE NEUTOROPHIL: 0.1 K/CU MM
TOTAL NUCLEATED RBC: 0 K/CU MM
TSH SERPL DL<=0.005 MIU/L-ACNC: 2.06 UIU/ML (ref 0.27–4.2)
WBC # BLD: 14.7 K/CU MM (ref 4–10.5)

## 2023-08-22 PROCEDURE — 85025 COMPLETE CBC W/AUTO DIFF WBC: CPT

## 2023-08-22 PROCEDURE — 83516 IMMUNOASSAY NONANTIBODY: CPT

## 2023-08-22 PROCEDURE — 82784 ASSAY IGA/IGD/IGG/IGM EACH: CPT

## 2023-08-22 PROCEDURE — 36415 COLL VENOUS BLD VENIPUNCTURE: CPT | Performed by: INTERNAL MEDICINE

## 2023-08-23 ENCOUNTER — TELEPHONE (OUTPATIENT)
Dept: INTERNAL MEDICINE CLINIC | Age: 73
End: 2023-08-23

## 2023-08-23 ENCOUNTER — HOSPITAL ENCOUNTER (OUTPATIENT)
Age: 73
Setting detail: SPECIMEN
Discharge: HOME | End: 2023-08-23
Payer: MEDICARE

## 2023-08-23 LAB
ASTROVIRUS PCR: NOT DETECTED
C CAYETANENSIS DNA SPEC QL NAA+PROBE: NOT DETECTED
CAMPY SP DNA.DIARRHEA STL QL NAA+PROBE: NOT DETECTED
CRYPTOSP DNA SPEC QL NAA+PROBE: NOT DETECTED
E COLI DNA SPEC QL NAA+PROBE: NOT DETECTED
E COLI ENTEROAGGREGATIVE PCR: NOT DETECTED
E COLI ENTEROPATHOGENIC PCR: NOT DETECTED
E COLI ENTEROTOXIGENIC PCR: NOT DETECTED
E COLI O157H7 DNA SPEC QL NAA+PROBE: NOT DETECTED
E HISTOLYT DNA SPEC QL NAA+PROBE: NOT DETECTED
EC STX1+STX2 + H7 FLIC SPEC NAA+PROBE: NOT DETECTED
G LAMBLIA DNA SPEC QL NAA+PROBE: NOT DETECTED
HADV DNA SPEC QL NAA+PROBE: NOT DETECTED
NOROVIRUS RNA SPEC QL NAA+PROBE: NOT DETECTED
P SHIGELLOIDES DNA STL QL NAA+PROBE: NOT DETECTED
RV RNA SPEC QL NAA+PROBE: NOT DETECTED
SALMONELLA DNA SPEC QL NAA+PROBE: NOT DETECTED
SAPOVIRUS PCR: NOT DETECTED
V CHOLERAE DNA SPEC QL NAA+PROBE: NOT DETECTED
VIBRIO DNA SPEC NAA+PROBE: NOT DETECTED
YERSINIA DNA SPEC NAA+PROBE: NOT DETECTED

## 2023-08-23 PROCEDURE — 87177 OVA AND PARASITES SMEARS: CPT

## 2023-08-23 PROCEDURE — 87449 NOS EACH ORGANISM AG IA: CPT

## 2023-08-23 PROCEDURE — 87507 IADNA-DNA/RNA PROBE TQ 12-25: CPT

## 2023-08-23 PROCEDURE — 87324 CLOSTRIDIUM AG IA: CPT

## 2023-08-23 PROCEDURE — 87209 SMEAR COMPLEX STAIN: CPT

## 2023-08-23 PROCEDURE — 83630 LACTOFERRIN FECAL (QUAL): CPT

## 2023-08-23 NOTE — TELEPHONE ENCOUNTER
Patient's  called states his brother has some inside information at [facility] states all Carlee Ayala is in one group. Would like for us to fax the referral to [facility]. Please advise. Thank you!

## 2023-08-24 DIAGNOSIS — K59.1 FUNCTIONAL DIARRHEA: Primary | ICD-10-CM

## 2023-08-24 LAB
C DIFF AG + TOXIN: NORMAL
LACTOFERRIN, QUAL: POSITIVE
SOURCE: NORMAL

## 2023-08-25 LAB — IMMUNOGLOBULIN A, SERUM: 9 UNITS (ref 0–19)

## 2023-08-29 ENCOUNTER — HOSPITAL ENCOUNTER (OUTPATIENT)
Age: 73
Setting detail: SPECIMEN
Discharge: HOME | End: 2023-08-29
Payer: MEDICARE

## 2023-08-29 PROCEDURE — 82705 FATS/LIPIDS FECES QUAL: CPT

## 2023-08-29 PROCEDURE — 84488 TEST FECES FOR TRYPSIN: CPT

## 2023-08-29 PROCEDURE — 83993 ASSAY FOR CALPROTECTIN FECAL: CPT

## 2023-08-30 LAB — INTERPRETATION: NEGATIVE

## 2023-08-31 LAB
CALPROTECTIN STL-MCNT: 27 UG/G
FAT QUALITATIVE NEUTRAL STOOL: NORMAL
FAT STL QL: NORMAL
TRYPSIN, STL: 28 UG/G

## 2023-09-21 ENCOUNTER — OFFICE VISIT (OUTPATIENT)
Dept: INTERNAL MEDICINE CLINIC | Age: 73
End: 2023-09-21
Payer: MEDICARE

## 2023-09-21 VITALS
DIASTOLIC BLOOD PRESSURE: 68 MMHG | OXYGEN SATURATION: 98 % | HEIGHT: 67 IN | BODY MASS INDEX: 18.11 KG/M2 | SYSTOLIC BLOOD PRESSURE: 100 MMHG | WEIGHT: 115.4 LBS | RESPIRATION RATE: 12 BRPM | HEART RATE: 72 BPM

## 2023-09-21 DIAGNOSIS — K59.1 FUNCTIONAL DIARRHEA: Primary | ICD-10-CM

## 2023-09-21 PROCEDURE — G8399 PT W/DXA RESULTS DOCUMENT: HCPCS | Performed by: INTERNAL MEDICINE

## 2023-09-21 PROCEDURE — 3017F COLORECTAL CA SCREEN DOC REV: CPT | Performed by: INTERNAL MEDICINE

## 2023-09-21 PROCEDURE — G8419 CALC BMI OUT NRM PARAM NOF/U: HCPCS | Performed by: INTERNAL MEDICINE

## 2023-09-21 PROCEDURE — 1036F TOBACCO NON-USER: CPT | Performed by: INTERNAL MEDICINE

## 2023-09-21 PROCEDURE — 1090F PRES/ABSN URINE INCON ASSESS: CPT | Performed by: INTERNAL MEDICINE

## 2023-09-21 PROCEDURE — 1123F ACP DISCUSS/DSCN MKR DOCD: CPT | Performed by: INTERNAL MEDICINE

## 2023-09-21 PROCEDURE — G8427 DOCREV CUR MEDS BY ELIG CLIN: HCPCS | Performed by: INTERNAL MEDICINE

## 2023-09-21 PROCEDURE — 99213 OFFICE O/P EST LOW 20 MIN: CPT | Performed by: INTERNAL MEDICINE

## 2023-09-21 RX ORDER — CHOLESTYRAMINE 4 G/9G
1 POWDER, FOR SUSPENSION ORAL 2 TIMES DAILY
Qty: 90 PACKET | Refills: 3
Start: 2023-09-21

## 2023-12-30 ENCOUNTER — HOSPITAL ENCOUNTER (OUTPATIENT)
Age: 73
Setting detail: SPECIMEN
Discharge: HOME | End: 2023-12-30
Payer: MEDICARE

## 2023-12-30 PROCEDURE — 87324 CLOSTRIDIUM AG IA: CPT

## 2023-12-30 PROCEDURE — 87507 IADNA-DNA/RNA PROBE TQ 12-25: CPT

## 2023-12-30 PROCEDURE — 87449 NOS EACH ORGANISM AG IA: CPT

## 2024-01-02 LAB
ASTROVIRUS PCR: NOT DETECTED
C CAYETANENSIS DNA SPEC QL NAA+PROBE: NOT DETECTED
C DIFF AG + TOXIN: NORMAL
CAMPY SP DNA.DIARRHEA STL QL NAA+PROBE: NOT DETECTED
CRYPTOSP DNA SPEC QL NAA+PROBE: NOT DETECTED
E COLI DNA SPEC QL NAA+PROBE: NOT DETECTED
E COLI ENTEROAGGREGATIVE PCR: NOT DETECTED
E COLI ENTEROPATHOGENIC PCR: ABNORMAL
E COLI ENTEROTOXIGENIC PCR: NOT DETECTED
E COLI O157H7 DNA SPEC QL NAA+PROBE: NOT DETECTED
E HISTOLYT DNA SPEC QL NAA+PROBE: NOT DETECTED
EC STX1+STX2 + H7 FLIC SPEC NAA+PROBE: NOT DETECTED
G LAMBLIA DNA SPEC QL NAA+PROBE: NOT DETECTED
HADV DNA SPEC QL NAA+PROBE: NOT DETECTED
NOROVIRUS RNA SPEC QL NAA+PROBE: NOT DETECTED
P SHIGELLOIDES DNA STL QL NAA+PROBE: NOT DETECTED
RV RNA SPEC QL NAA+PROBE: NOT DETECTED
SALMONELLA DNA SPEC QL NAA+PROBE: NOT DETECTED
SAPOVIRUS PCR: NOT DETECTED
SOURCE: NORMAL
V CHOLERAE DNA SPEC QL NAA+PROBE: NOT DETECTED
VIBRIO DNA SPEC NAA+PROBE: NOT DETECTED
YERSINIA DNA SPEC NAA+PROBE: NOT DETECTED

## 2024-05-20 SDOH — ECONOMIC STABILITY: HOUSING INSECURITY
IN THE LAST 12 MONTHS, WAS THERE A TIME WHEN YOU DID NOT HAVE A STEADY PLACE TO SLEEP OR SLEPT IN A SHELTER (INCLUDING NOW)?: NO

## 2024-05-20 SDOH — ECONOMIC STABILITY: FOOD INSECURITY: WITHIN THE PAST 12 MONTHS, YOU WORRIED THAT YOUR FOOD WOULD RUN OUT BEFORE YOU GOT MONEY TO BUY MORE.: NEVER TRUE

## 2024-05-20 SDOH — ECONOMIC STABILITY: FOOD INSECURITY: WITHIN THE PAST 12 MONTHS, THE FOOD YOU BOUGHT JUST DIDN'T LAST AND YOU DIDN'T HAVE MONEY TO GET MORE.: NEVER TRUE

## 2024-05-20 SDOH — HEALTH STABILITY: PHYSICAL HEALTH: ON AVERAGE, HOW MANY DAYS PER WEEK DO YOU ENGAGE IN MODERATE TO STRENUOUS EXERCISE (LIKE A BRISK WALK)?: 7 DAYS

## 2024-05-20 SDOH — ECONOMIC STABILITY: TRANSPORTATION INSECURITY
IN THE PAST 12 MONTHS, HAS LACK OF TRANSPORTATION KEPT YOU FROM MEETINGS, WORK, OR FROM GETTING THINGS NEEDED FOR DAILY LIVING?: NO

## 2024-05-20 SDOH — ECONOMIC STABILITY: INCOME INSECURITY: HOW HARD IS IT FOR YOU TO PAY FOR THE VERY BASICS LIKE FOOD, HOUSING, MEDICAL CARE, AND HEATING?: NOT HARD AT ALL

## 2024-05-20 SDOH — HEALTH STABILITY: PHYSICAL HEALTH: ON AVERAGE, HOW MANY MINUTES DO YOU ENGAGE IN EXERCISE AT THIS LEVEL?: 90 MIN

## 2024-05-20 ASSESSMENT — LIFESTYLE VARIABLES
HOW MANY STANDARD DRINKS CONTAINING ALCOHOL DO YOU HAVE ON A TYPICAL DAY: 1
HOW OFTEN DO YOU HAVE SIX OR MORE DRINKS ON ONE OCCASION: 1
HOW OFTEN DO YOU HAVE A DRINK CONTAINING ALCOHOL: 4
HOW MANY STANDARD DRINKS CONTAINING ALCOHOL DO YOU HAVE ON A TYPICAL DAY: 1 OR 2
HOW OFTEN DO YOU HAVE A DRINK CONTAINING ALCOHOL: 2-3 TIMES A WEEK

## 2024-05-20 ASSESSMENT — PATIENT HEALTH QUESTIONNAIRE - PHQ9
SUM OF ALL RESPONSES TO PHQ QUESTIONS 1-9: 0
SUM OF ALL RESPONSES TO PHQ QUESTIONS 1-9: 0
2. FEELING DOWN, DEPRESSED OR HOPELESS: NOT AT ALL
SUM OF ALL RESPONSES TO PHQ QUESTIONS 1-9: 0
SUM OF ALL RESPONSES TO PHQ9 QUESTIONS 1 & 2: 0
1. LITTLE INTEREST OR PLEASURE IN DOING THINGS: NOT AT ALL
SUM OF ALL RESPONSES TO PHQ QUESTIONS 1-9: 0

## 2024-05-21 ENCOUNTER — TELEMEDICINE (OUTPATIENT)
Dept: INTERNAL MEDICINE CLINIC | Age: 74
End: 2024-05-21
Payer: MEDICARE

## 2024-05-21 DIAGNOSIS — Z00.00 MEDICARE ANNUAL WELLNESS VISIT, SUBSEQUENT: Primary | ICD-10-CM

## 2024-05-21 PROCEDURE — 3017F COLORECTAL CA SCREEN DOC REV: CPT | Performed by: INTERNAL MEDICINE

## 2024-05-21 PROCEDURE — G0439 PPPS, SUBSEQ VISIT: HCPCS | Performed by: INTERNAL MEDICINE

## 2024-05-21 PROCEDURE — 1123F ACP DISCUSS/DSCN MKR DOCD: CPT | Performed by: INTERNAL MEDICINE

## 2024-05-21 SDOH — ECONOMIC STABILITY: INCOME INSECURITY: HOW HARD IS IT FOR YOU TO PAY FOR THE VERY BASICS LIKE FOOD, HOUSING, MEDICAL CARE, AND HEATING?: NOT HARD AT ALL

## 2024-05-21 SDOH — ECONOMIC STABILITY: FOOD INSECURITY: WITHIN THE PAST 12 MONTHS, THE FOOD YOU BOUGHT JUST DIDN'T LAST AND YOU DIDN'T HAVE MONEY TO GET MORE.: NEVER TRUE

## 2024-05-21 SDOH — ECONOMIC STABILITY: FOOD INSECURITY: WITHIN THE PAST 12 MONTHS, YOU WORRIED THAT YOUR FOOD WOULD RUN OUT BEFORE YOU GOT MONEY TO BUY MORE.: NEVER TRUE

## 2024-05-21 ASSESSMENT — PATIENT HEALTH QUESTIONNAIRE - PHQ9
SUM OF ALL RESPONSES TO PHQ QUESTIONS 1-9: 0
SUM OF ALL RESPONSES TO PHQ9 QUESTIONS 1 & 2: 0
SUM OF ALL RESPONSES TO PHQ QUESTIONS 1-9: 0
SUM OF ALL RESPONSES TO PHQ QUESTIONS 1-9: 0
1. LITTLE INTEREST OR PLEASURE IN DOING THINGS: NOT AT ALL
SUM OF ALL RESPONSES TO PHQ QUESTIONS 1-9: 0
2. FEELING DOWN, DEPRESSED OR HOPELESS: NOT AT ALL

## 2024-05-21 ASSESSMENT — LIFESTYLE VARIABLES
HOW MANY STANDARD DRINKS CONTAINING ALCOHOL DO YOU HAVE ON A TYPICAL DAY: 1 OR 2
HOW OFTEN DO YOU HAVE A DRINK CONTAINING ALCOHOL: 2-3 TIMES A WEEK

## 2024-05-21 NOTE — PROGRESS NOTES
Medicare Annual Wellness Visit    Miladys Li is here for Medicare AWV    Assessment & Plan   Medicare annual wellness visit, subsequent  Recommendations for Preventive Services Due: see orders and patient instructions/AVS.  Recommended screening schedule for the next 5-10 years is provided to the patient in written form: see Patient Instructions/AVS.     No follow-ups on file.     Subjective       Patient's complete Health Risk Assessment and screening values have been reviewed and are found in Flowsheets. The following problems were reviewed today and where indicated follow up appointments were made and/or referrals ordered.    Positive Risk Factor Screenings with Interventions:                Activity, Diet, and Weight:  On average, how many days per week do you engage in moderate to strenuous exercise (like a brisk walk)?: 7 days  On average, how many minutes do you engage in exercise at this level?: 90 min    Do you eat balanced/healthy meals regularly?: Yes    There is no height or weight on file to calculate BMI. (!) Abnormal    Underweight Interventions:  Patient declines any further evaluation or treatment                           Objective      Patient-Reported Vitals  No data recorded     Unable to obtain 3 vital signs due to patient not having equipment to take blood pressure/temperature.           Allergies   Allergen Reactions    Haemophilus Influenzae Vaccines Shortness Of Breath     Prior to Visit Medications    Medication Sig Taking? Authorizing Provider   Digestive Enzymes (SUPER ENZYMES) TABS Take 1 tablet by mouth 2 times daily Yes Belkys Foss MD   Probiotic Product (PROBIOTIC PO) Take by mouth daily Yes Belkys Foss MD   b complex vitamins capsule Take 1 capsule by mouth daily Yes Belkys Foss MD   Calcium Acetate, Phos Binder, (CALCIUM ACETATE PO) Take 1 tablet by mouth Daily with supper Yes Belkys Foss MD   coenzyme Q10 100 MG CAPS capsule Take 1

## 2024-05-21 NOTE — PATIENT INSTRUCTIONS
Personalized Preventive Plan for Miladys Li - 5/21/2024  Medicare offers a range of preventive health benefits. Some of the tests and screenings are paid in full while other may be subject to a deductible, co-insurance, and/or copay.    Some of these benefits include a comprehensive review of your medical history including lifestyle, illnesses that may run in your family, and various assessments and screenings as appropriate.    After reviewing your medical record and screening and assessments performed today your provider may have ordered immunizations, labs, imaging, and/or referrals for you.  A list of these orders (if applicable) as well as your Preventive Care list are included within your After Visit Summary for your review.    Other Preventive Recommendations:    A preventive eye exam performed by an eye specialist is recommended every 1-2 years to screen for glaucoma; cataracts, macular degeneration, and other eye disorders.  A preventive dental visit is recommended every 6 months.  Try to get at least 150 minutes of exercise per week or 10,000 steps per day on a pedometer .  Order or download the FREE \"Exercise & Physical Activity: Your Everyday Guide\" from [employer]. Call 1-467.800.4824 or search [employer] online.  You need 0344-2035 mg of calcium and 5385-1138 IU of vitamin D per day. It is possible to meet your calcium requirement with diet alone, but a vitamin D supplement is usually necessary to meet this goal.  When exposed to the sun, use a sunscreen that protects against both UVA and UVB radiation with an SPF of 30 or greater. Reapply every 2 to 3 hours or after sweating, drying off with a towel, or swimming.  Always wear a seat belt when traveling in a car. Always wear a helmet when riding a bicycle or motorcycle.  
actual

## 2024-08-23 SDOH — HEALTH STABILITY: PHYSICAL HEALTH: ON AVERAGE, HOW MANY MINUTES DO YOU ENGAGE IN EXERCISE AT THIS LEVEL?: 120 MIN

## 2024-08-23 SDOH — HEALTH STABILITY: PHYSICAL HEALTH: ON AVERAGE, HOW MANY DAYS PER WEEK DO YOU ENGAGE IN MODERATE TO STRENUOUS EXERCISE (LIKE A BRISK WALK)?: 5 DAYS

## 2024-08-26 ENCOUNTER — OFFICE VISIT (OUTPATIENT)
Dept: INTERNAL MEDICINE CLINIC | Age: 74
End: 2024-08-26
Payer: MEDICARE

## 2024-08-26 VITALS
OXYGEN SATURATION: 98 % | DIASTOLIC BLOOD PRESSURE: 68 MMHG | BODY MASS INDEX: 20.2 KG/M2 | WEIGHT: 129 LBS | HEART RATE: 74 BPM | SYSTOLIC BLOOD PRESSURE: 122 MMHG

## 2024-08-26 DIAGNOSIS — Z00.00 GENERAL MEDICAL EXAM: ICD-10-CM

## 2024-08-26 DIAGNOSIS — E78.00 HYPERCHOLESTEROLEMIA: ICD-10-CM

## 2024-08-26 DIAGNOSIS — G20.A1 PARKINSON'S DISEASE, UNSPECIFIED WHETHER DYSKINESIA PRESENT, UNSPECIFIED WHETHER MANIFESTATIONS FLUCTUATE (HCC): Primary | ICD-10-CM

## 2024-08-26 DIAGNOSIS — R07.9 CHEST PAIN, UNSPECIFIED TYPE: ICD-10-CM

## 2024-08-26 DIAGNOSIS — M81.0 AGE-RELATED OSTEOPOROSIS WITHOUT CURRENT PATHOLOGICAL FRACTURE: ICD-10-CM

## 2024-08-26 DIAGNOSIS — E04.1 THYROID NODULE: ICD-10-CM

## 2024-08-26 DIAGNOSIS — E55.9 VITAMIN D DEFICIENCY: ICD-10-CM

## 2024-08-26 PROCEDURE — 1036F TOBACCO NON-USER: CPT | Performed by: FAMILY MEDICINE

## 2024-08-26 PROCEDURE — 1123F ACP DISCUSS/DSCN MKR DOCD: CPT | Performed by: FAMILY MEDICINE

## 2024-08-26 PROCEDURE — G8420 CALC BMI NORM PARAMETERS: HCPCS | Performed by: FAMILY MEDICINE

## 2024-08-26 PROCEDURE — G8399 PT W/DXA RESULTS DOCUMENT: HCPCS | Performed by: FAMILY MEDICINE

## 2024-08-26 PROCEDURE — G8427 DOCREV CUR MEDS BY ELIG CLIN: HCPCS | Performed by: FAMILY MEDICINE

## 2024-08-26 PROCEDURE — 1090F PRES/ABSN URINE INCON ASSESS: CPT | Performed by: FAMILY MEDICINE

## 2024-08-26 PROCEDURE — 3017F COLORECTAL CA SCREEN DOC REV: CPT | Performed by: FAMILY MEDICINE

## 2024-08-26 PROCEDURE — 99204 OFFICE O/P NEW MOD 45 MIN: CPT | Performed by: FAMILY MEDICINE

## 2024-08-26 ASSESSMENT — ENCOUNTER SYMPTOMS
CHEST TIGHTNESS: 0
ABDOMINAL PAIN: 0
CONSTIPATION: 0
DIARRHEA: 0
VOMITING: 0
COLOR CHANGE: 0
SHORTNESS OF BREATH: 0
SORE THROAT: 0

## 2024-08-26 NOTE — PROGRESS NOTES
Subjective:      Chief Complaint   Patient presents with    Establish Care     Former patient of Dr Zavala    Other     Patient has parkinson's disease and see how that is going-talk about thyroid-discuss osteoporosis and discuss pelvic floor        HPI:  Miladys Li is a 73 y.o. female who presents today to establish care with new provider and for management of chronic medical conditions as below.     Parkinson disease:  Patient states she was diagnosed in 2020 (due to bradykinesia).  Was initially part of a research study (which she responded well to), but study was ended and was switched to sinemet.  States she has been doing well on sinemet- symptoms have been stable.  Denies tremor, rigidity or dementia symptoms.  Sees neurology at [facility] every 6 months.      Was also following with GI for diarrhea, but was found to have ecoli infection and has been doing well since completing treatment.  Is up to date on colon cancer screening (had 2 colonoscopies in 2023).  No family history of colon cancer.     Has history of thyroid nodules for which she was seeing endocrinology, but provider retired.  Last US was over a year ago (April 2023).  Has never required synthroid/medications.     Has history of kidney stones, sees urology every other year (Dr. Raymond).      Does have family history of uterine cancer and leukemia.     Is up to date on mammogram.     Last DEXA showed osteoporosis.  Was advised to start medication but patient was wary of benefit and potential risks.  Does take vitamin D and calcium supplementation, states she walks and exercises very regularly.      Does state she has a history of chest pain.  First episode was 9 years ago- was seen in ER and had cardiac cath due to EKG changes.  Cath was negative, was told symptoms were due to coronary artery spasms.  States that symptoms have been ongoing intermittently since then.  Are infrequent- only occur once a year or less.  Feels like a pressure sensation,  has radiation of pain into her arm.  Lasts several minutes, then resolves.  Can happen at rest, has woken her out of sleep.    Does have a significant family history of cardiovascular disease- father had MI in his 40's.  Last episode for patient was about 6 months ago.  Currently asymptomatic.      No other concerns today.         Past Medical History:   Diagnosis Date    Coronary artery spasm (HCC)     negative cardiac cath 2015    Kidney stone     Parkinson disease (HCC)     2020, following with neuro@[facility]    Thyroid nodule         Past Surgical History:   Procedure Laterality Date    BREAST BIOPSY      CATARACT REMOVAL Bilateral 09/2020    EYE MUSCLE SURGERY      childhood       Social History     Tobacco Use    Smoking status: Never    Smokeless tobacco: Never   Substance Use Topics    Alcohol use: Yes     Alcohol/week: 2.0 standard drinks of alcohol     Types: 2 Glasses of wine per week        Review of Systems   Constitutional:  Negative for activity change, appetite change, chills, fever and unexpected weight change.   HENT:  Negative for congestion and sore throat.    Respiratory:  Negative for chest tightness and shortness of breath.    Cardiovascular:  Negative for chest pain and palpitations.   Gastrointestinal:  Negative for abdominal pain, constipation, diarrhea and vomiting.   Genitourinary:  Negative for dysuria.   Skin:  Negative for color change.   Neurological:  Negative for dizziness and light-headedness.   Psychiatric/Behavioral:  Negative for dysphoric mood. The patient is not nervous/anxious.         Prior to Visit Medications    Medication Sig Taking? Authorizing Provider   Digestive Enzymes (SUPER ENZYMES) TABS Take 1 tablet by mouth 2 times daily Yes Belkys Foss MD   Probiotic Product (PROBIOTIC PO) Take by mouth daily Yes Belkys Foss MD   b complex vitamins capsule Take 1 capsule by mouth daily Yes Belkys Foss MD   Calcium Acetate, Phos Binder, (CALCIUM ACETATE

## 2024-09-03 ENCOUNTER — HOSPITAL ENCOUNTER (OUTPATIENT)
Dept: ULTRASOUND IMAGING | Age: 74
Discharge: HOME | End: 2024-09-03
Attending: FAMILY MEDICINE
Payer: MEDICARE

## 2024-09-03 DIAGNOSIS — E04.1 THYROID NODULE: ICD-10-CM

## 2024-09-03 PROCEDURE — 76536 US EXAM OF HEAD AND NECK: CPT

## 2024-09-11 ENCOUNTER — LAB (OUTPATIENT)
Dept: INTERNAL MEDICINE CLINIC | Age: 74
End: 2024-09-11
Payer: MEDICARE

## 2024-09-11 DIAGNOSIS — E04.1 THYROID NODULE: ICD-10-CM

## 2024-09-11 DIAGNOSIS — E55.9 VITAMIN D DEFICIENCY: Primary | ICD-10-CM

## 2024-09-11 DIAGNOSIS — M81.0 AGE-RELATED OSTEOPOROSIS WITHOUT CURRENT PATHOLOGICAL FRACTURE: ICD-10-CM

## 2024-09-11 DIAGNOSIS — Z00.00 GENERAL MEDICAL EXAM: ICD-10-CM

## 2024-09-11 DIAGNOSIS — E78.00 HYPERCHOLESTEROLEMIA: ICD-10-CM

## 2024-09-11 LAB
25(OH)D3 SERPL-MCNC: 52.8 NG/ML
ALBUMIN SERPL-MCNC: 4 G/DL (ref 3.4–5)
ALBUMIN/GLOB SERPL: 1.7 {RATIO} (ref 1.1–2.2)
ALP SERPL-CCNC: 54 U/L (ref 40–129)
ALT SERPL-CCNC: 8 U/L (ref 10–40)
ANION GAP SERPL CALCULATED.3IONS-SCNC: 11 MMOL/L (ref 3–16)
AST SERPL-CCNC: 19 U/L (ref 15–37)
BASOPHILS # BLD: 0.1 K/UL (ref 0–0.2)
BASOPHILS NFR BLD: 1.4 %
BILIRUB SERPL-MCNC: 0.3 MG/DL (ref 0–1)
BUN SERPL-MCNC: 22 MG/DL (ref 7–20)
CALCIUM SERPL-MCNC: 9.3 MG/DL (ref 8.3–10.6)
CHLORIDE SERPL-SCNC: 105 MMOL/L (ref 99–110)
CHOLEST SERPL-MCNC: 224 MG/DL (ref 0–199)
CO2 SERPL-SCNC: 26 MMOL/L (ref 21–32)
CREAT SERPL-MCNC: 0.6 MG/DL (ref 0.6–1.2)
DEPRECATED RDW RBC AUTO: 13.3 % (ref 12.4–15.4)
EOSINOPHIL # BLD: 0.2 K/UL (ref 0–0.6)
EOSINOPHIL NFR BLD: 3 %
FOLATE SERPL-MCNC: 6.44 NG/ML (ref 4.78–24.2)
GFR SERPLBLD CREATININE-BSD FMLA CKD-EPI: >90 ML/MIN/{1.73_M2}
GLUCOSE SERPL-MCNC: 82 MG/DL (ref 70–99)
HCT VFR BLD AUTO: 36.9 % (ref 36–48)
HDLC SERPL-MCNC: 89 MG/DL (ref 40–60)
HGB BLD-MCNC: 12.6 G/DL (ref 12–16)
LDLC SERPL CALC-MCNC: 126 MG/DL
LYMPHOCYTES # BLD: 1.8 K/UL (ref 1–5.1)
LYMPHOCYTES NFR BLD: 35 %
MCH RBC QN AUTO: 31 PG (ref 26–34)
MCHC RBC AUTO-ENTMCNC: 34.1 G/DL (ref 31–36)
MCV RBC AUTO: 90.7 FL (ref 80–100)
MONOCYTES # BLD: 0.4 K/UL (ref 0–1.3)
MONOCYTES NFR BLD: 8.5 %
NEUTROPHILS # BLD: 2.7 K/UL (ref 1.7–7.7)
NEUTROPHILS NFR BLD: 52.1 %
PLATELET # BLD AUTO: 271 K/UL (ref 135–450)
PMV BLD AUTO: 9.1 FL (ref 5–10.5)
POTASSIUM SERPL-SCNC: 4.3 MMOL/L (ref 3.5–5.1)
PROT SERPL-MCNC: 6.3 G/DL (ref 6.4–8.2)
RBC # BLD AUTO: 4.07 M/UL (ref 4–5.2)
SODIUM SERPL-SCNC: 142 MMOL/L (ref 136–145)
TRIGL SERPL-MCNC: 44 MG/DL (ref 0–150)
VIT B12 SERPL-MCNC: 777 PG/ML (ref 211–911)
VLDLC SERPL CALC-MCNC: 9 MG/DL
WBC # BLD AUTO: 5.2 K/UL (ref 4–11)

## 2024-09-11 PROCEDURE — 36415 COLL VENOUS BLD VENIPUNCTURE: CPT | Performed by: FAMILY MEDICINE

## 2024-09-12 LAB
EST. AVERAGE GLUCOSE BLD GHB EST-MCNC: 108.3 MG/DL
HBA1C MFR BLD: 5.4 %

## 2024-12-19 ENCOUNTER — TELEPHONE (OUTPATIENT)
Dept: INTERNAL MEDICINE CLINIC | Age: 74
End: 2024-12-19

## 2024-12-19 NOTE — TELEPHONE ENCOUNTER
Form has been completed and faxed to Barbara Marcano at [facility] [phone].  patient notified.

## 2024-12-19 NOTE — TELEPHONE ENCOUNTER
Patient came in the office stating that they need a physicians release signed for her to do a boxing class at the . Deadline is the 1/6/2025 to be turned in. In Mailbox

## 2025-03-20 ENCOUNTER — HOSPITAL ENCOUNTER (OUTPATIENT)
Dept: GENERAL RADIOLOGY | Age: 75
Discharge: HOME | End: 2025-03-20
Payer: MEDICARE

## 2025-03-20 ENCOUNTER — HOSPITAL ENCOUNTER (OUTPATIENT)
Age: 75
Discharge: HOME | End: 2025-03-20
Payer: MEDICARE

## 2025-03-20 ENCOUNTER — OFFICE VISIT (OUTPATIENT)
Dept: FAMILY MEDICINE CLINIC | Age: 75
End: 2025-03-20

## 2025-03-20 ENCOUNTER — RESULTS FOLLOW-UP (OUTPATIENT)
Dept: GENERAL RADIOLOGY | Age: 75
End: 2025-03-20

## 2025-03-20 VITALS
OXYGEN SATURATION: 95 % | TEMPERATURE: 98 F | SYSTOLIC BLOOD PRESSURE: 118 MMHG | DIASTOLIC BLOOD PRESSURE: 82 MMHG | HEART RATE: 72 BPM | WEIGHT: 136 LBS | BODY MASS INDEX: 21.3 KG/M2

## 2025-03-20 DIAGNOSIS — M25.562 ACUTE PAIN OF LEFT KNEE: ICD-10-CM

## 2025-03-20 DIAGNOSIS — M25.562 ACUTE PAIN OF LEFT KNEE: Primary | ICD-10-CM

## 2025-03-20 PROCEDURE — 73564 X-RAY EXAM KNEE 4 OR MORE: CPT

## 2025-03-20 ASSESSMENT — ENCOUNTER SYMPTOMS
SORE THROAT: 0
RHINORRHEA: 0
SHORTNESS OF BREATH: 0
DIARRHEA: 0
NAUSEA: 0
SINUS PAIN: 0
SINUS PRESSURE: 0
COUGH: 0
CHEST TIGHTNESS: 0
WHEEZING: 0
VOMITING: 0

## 2025-03-20 NOTE — PROGRESS NOTES
Miladys Li   74 y.o.  female  [id number]      Chief Complaint   Patient presents with    Knee Pain     Pt states she is having left knee pain- ongoing for a couple weeks.        Subjective:  74 y.o.female is here for a follow up. She has the following chronic/acute medical problems:  Patient Active Problem List   Diagnosis    Parkinson's disease    Age-related osteoporosis without current pathological fracture    Muscle cramp    Vitamin D deficiency    Thyroid nodule       HPI   History of Present Illness  The patient presents for evaluation of left knee pain.    She reports experiencing significant pain in her left knee, which she first noticed approximately 2 to 3 weeks ago while descending stairs. Despite the initial discomfort, she managed to continue her daily exercise routine, which she maintains due to her Parkinson's disease. However, a recent COVID-19 infection has limited her physical activity, leading to an exacerbation of her knee pain. The pain is particularly pronounced when transitioning from sitting to standing or vice versa, and it impedes her ability to walk normally. She also reports swelling in the affected knee. She has not taken any medication for the pain today but did use ibuprofen during her COVID-19 infection. She typically applies three ice packs to the area for relief.         Review of Systems   Constitutional:  Negative for appetite change, chills, fatigue and fever.   HENT:  Negative for congestion, ear pain, postnasal drip, rhinorrhea, sinus pressure, sinus pain, sneezing and sore throat.    Respiratory:  Negative for cough, chest tightness, shortness of breath and wheezing.    Cardiovascular:  Negative for chest pain and palpitations.   Gastrointestinal:  Negative for diarrhea, nausea and vomiting.   Skin:  Negative for rash.   Neurological:  Negative for dizziness, light-headedness and headaches.       Current Outpatient Medications   Medication Sig Dispense Refill    Digestive

## 2025-03-30 SDOH — HEALTH STABILITY: PHYSICAL HEALTH: ON AVERAGE, HOW MANY DAYS PER WEEK DO YOU ENGAGE IN MODERATE TO STRENUOUS EXERCISE (LIKE A BRISK WALK)?: 5 DAYS

## 2025-03-30 SDOH — HEALTH STABILITY: PHYSICAL HEALTH: ON AVERAGE, HOW MANY MINUTES DO YOU ENGAGE IN EXERCISE AT THIS LEVEL?: 60 MIN

## 2025-03-31 ENCOUNTER — OFFICE VISIT (OUTPATIENT)
Dept: ORTHOPEDIC SURGERY | Age: 75
End: 2025-03-31
Payer: MEDICARE

## 2025-03-31 VITALS
HEIGHT: 67 IN | OXYGEN SATURATION: 98 % | WEIGHT: 138 LBS | RESPIRATION RATE: 14 BRPM | HEART RATE: 76 BPM | BODY MASS INDEX: 21.66 KG/M2

## 2025-03-31 DIAGNOSIS — M17.12 ARTHRITIS OF LEFT KNEE: Primary | ICD-10-CM

## 2025-03-31 PROCEDURE — 20610 DRAIN/INJ JOINT/BURSA W/O US: CPT | Performed by: PHYSICIAN ASSISTANT

## 2025-03-31 PROCEDURE — 1160F RVW MEDS BY RX/DR IN RCRD: CPT | Performed by: PHYSICIAN ASSISTANT

## 2025-03-31 PROCEDURE — G8399 PT W/DXA RESULTS DOCUMENT: HCPCS | Performed by: PHYSICIAN ASSISTANT

## 2025-03-31 PROCEDURE — 1125F AMNT PAIN NOTED PAIN PRSNT: CPT | Performed by: PHYSICIAN ASSISTANT

## 2025-03-31 PROCEDURE — 99204 OFFICE O/P NEW MOD 45 MIN: CPT | Performed by: PHYSICIAN ASSISTANT

## 2025-03-31 PROCEDURE — G8420 CALC BMI NORM PARAMETERS: HCPCS | Performed by: PHYSICIAN ASSISTANT

## 2025-03-31 PROCEDURE — 1090F PRES/ABSN URINE INCON ASSESS: CPT | Performed by: PHYSICIAN ASSISTANT

## 2025-03-31 PROCEDURE — 1159F MED LIST DOCD IN RCRD: CPT | Performed by: PHYSICIAN ASSISTANT

## 2025-03-31 PROCEDURE — 1123F ACP DISCUSS/DSCN MKR DOCD: CPT | Performed by: PHYSICIAN ASSISTANT

## 2025-03-31 PROCEDURE — 1036F TOBACCO NON-USER: CPT | Performed by: PHYSICIAN ASSISTANT

## 2025-03-31 PROCEDURE — 3017F COLORECTAL CA SCREEN DOC REV: CPT | Performed by: PHYSICIAN ASSISTANT

## 2025-03-31 PROCEDURE — G8427 DOCREV CUR MEDS BY ELIG CLIN: HCPCS | Performed by: PHYSICIAN ASSISTANT

## 2025-03-31 RX ORDER — TRIAMCINOLONE ACETONIDE 40 MG/ML
40 INJECTION, SUSPENSION INTRA-ARTICULAR; INTRAMUSCULAR ONCE
Status: COMPLETED | OUTPATIENT
Start: 2025-03-31 | End: 2025-03-31

## 2025-03-31 RX ADMIN — TRIAMCINOLONE ACETONIDE 40 MG: 40 INJECTION, SUSPENSION INTRA-ARTICULAR; INTRAMUSCULAR at 08:36

## 2025-03-31 ASSESSMENT — ENCOUNTER SYMPTOMS
EYES NEGATIVE: 1
RESPIRATORY NEGATIVE: 1
GASTROINTESTINAL NEGATIVE: 1

## 2025-03-31 NOTE — PROGRESS NOTES
Miladys Li is a 74 y.o. y.o. year old female who complains of Left knee pain and pain located medial side. Starting posteriorly, but has moved to the medial side, stiffness, and swelling. Patient denies any falls or injuries, but the pain did start about 3-4 weeks ago. Patient claims the pain increases more at night then the day. Patient has been trying to use ibuprofen and icing with slight relief.

## 2025-03-31 NOTE — PROGRESS NOTES
Review of Systems   Constitutional: Negative.    HENT: Negative.     Eyes: Negative.    Respiratory: Negative.     Cardiovascular: Negative.    Gastrointestinal: Negative.    Genitourinary: Negative.    Musculoskeletal:  Positive for arthralgias and myalgias.   Skin: Negative.    Neurological: Negative.    Psychiatric/Behavioral: Negative.           HPI:  Miladys Li is a 74 y.o. female that presents to the office today with complaints of left knee pain has been going on for 3 to 4 weeks with no known injury.  She states that she does not recall any particular injury.  She exercises 5 or 6 days a week religiously because of Parkinson's diagnosis and the exercise helps with the Parkinson's.        Past Medical History:   Diagnosis Date    Coronary artery spasm     negative cardiac cath 2015    Kidney stone     Parkinson disease (HCC)     2020, following with neuro@[facility]    Thyroid nodule        Past Surgical History:   Procedure Laterality Date    BREAST BIOPSY      CATARACT REMOVAL Bilateral 09/2020    EYE MUSCLE SURGERY      childhood       Family History   Problem Relation Age of Onset    Uterine Cancer Mother     Cancer Mother     Cancer Father         leukemia    Coronary Art Dis Father     Other Sister         heart spasms    Heart Disease Sister     Heart Disease Sister     Other Sister         heart spasms    Cancer Sister         melanoma    Heart Disease Sister         stent    Heart Attack Sister     Other Sister         heart spasms    Seizures Brother     Epilepsy Brother     No Known Problems Brother        Social History     Socioeconomic History    Marital status:      Spouse name: None    Number of children: None    Years of education: None    Highest education level: None   Tobacco Use    Smoking status: Never    Smokeless tobacco: Never   Vaping Use    Vaping status: Never Used   Substance and Sexual Activity    Alcohol use: Yes     Alcohol/week: 2.0 standard drinks of alcohol     Types:

## 2025-03-31 NOTE — PATIENT INSTRUCTIONS
Continue weight-bearing as tolerated.  Continue range of motion exercises as instructed.  Ice and elevate as needed.  Tylenol or Motrin for pain.  Injection given into the left knee.  Follow up in 6 weeks.    We are committed to providing you the best care possible.  If you receive a survey after visiting one of our offices, please take time to share your experience concerning your physician office visit.  These surveys are confidential and no health information about you is shared.  We are eager to improve for you and we are counting on your feedback to help make that happen.

## 2025-06-12 ENCOUNTER — HOSPITAL ENCOUNTER (OUTPATIENT)
Age: 75
Discharge: HOME | End: 2025-06-12
Payer: MEDICARE

## 2025-06-12 DIAGNOSIS — Z00.00 GENERAL MEDICAL EXAM: ICD-10-CM

## 2025-06-12 LAB — TSH SERPL DL<=0.05 MIU/L-ACNC: 1.48 UIU/ML (ref 0.27–4.2)

## 2025-06-12 PROCEDURE — 36415 COLL VENOUS BLD VENIPUNCTURE: CPT

## 2025-06-12 PROCEDURE — 84443 ASSAY THYROID STIM HORMONE: CPT

## 2025-06-18 SDOH — ECONOMIC STABILITY: TRANSPORTATION INSECURITY
IN THE PAST 12 MONTHS, HAS THE LACK OF TRANSPORTATION KEPT YOU FROM MEDICAL APPOINTMENTS OR FROM GETTING MEDICATIONS?: NO

## 2025-06-18 SDOH — ECONOMIC STABILITY: INCOME INSECURITY: IN THE LAST 12 MONTHS, WAS THERE A TIME WHEN YOU WERE NOT ABLE TO PAY THE MORTGAGE OR RENT ON TIME?: NO

## 2025-06-18 SDOH — ECONOMIC STABILITY: FOOD INSECURITY: WITHIN THE PAST 12 MONTHS, YOU WORRIED THAT YOUR FOOD WOULD RUN OUT BEFORE YOU GOT MONEY TO BUY MORE.: NEVER TRUE

## 2025-06-18 SDOH — ECONOMIC STABILITY: FOOD INSECURITY: WITHIN THE PAST 12 MONTHS, THE FOOD YOU BOUGHT JUST DIDN'T LAST AND YOU DIDN'T HAVE MONEY TO GET MORE.: NEVER TRUE

## 2025-06-18 ASSESSMENT — PATIENT HEALTH QUESTIONNAIRE - PHQ9
SUM OF ALL RESPONSES TO PHQ QUESTIONS 1-9: 0
SUM OF ALL RESPONSES TO PHQ QUESTIONS 1-9: 0
2. FEELING DOWN, DEPRESSED OR HOPELESS: NOT AT ALL
SUM OF ALL RESPONSES TO PHQ9 QUESTIONS 1 & 2: 0
1. LITTLE INTEREST OR PLEASURE IN DOING THINGS: NOT AT ALL
1. LITTLE INTEREST OR PLEASURE IN DOING THINGS: NOT AT ALL
SUM OF ALL RESPONSES TO PHQ QUESTIONS 1-9: 0
2. FEELING DOWN, DEPRESSED OR HOPELESS: NOT AT ALL
SUM OF ALL RESPONSES TO PHQ QUESTIONS 1-9: 0

## 2025-06-19 ENCOUNTER — OFFICE VISIT (OUTPATIENT)
Dept: INTERNAL MEDICINE CLINIC | Age: 75
End: 2025-06-19

## 2025-06-19 VITALS
WEIGHT: 138.4 LBS | HEART RATE: 76 BPM | BODY MASS INDEX: 21.72 KG/M2 | OXYGEN SATURATION: 99 % | SYSTOLIC BLOOD PRESSURE: 110 MMHG | DIASTOLIC BLOOD PRESSURE: 78 MMHG | HEIGHT: 67 IN

## 2025-06-19 DIAGNOSIS — E55.9 VITAMIN D DEFICIENCY: ICD-10-CM

## 2025-06-19 DIAGNOSIS — Z12.31 ENCOUNTER FOR SCREENING MAMMOGRAM FOR MALIGNANT NEOPLASM OF BREAST: ICD-10-CM

## 2025-06-19 DIAGNOSIS — E04.1 THYROID NODULE: ICD-10-CM

## 2025-06-19 DIAGNOSIS — G20.A1 PARKINSON'S DISEASE, UNSPECIFIED WHETHER DYSKINESIA PRESENT, UNSPECIFIED WHETHER MANIFESTATIONS FLUCTUATE (HCC): ICD-10-CM

## 2025-06-19 DIAGNOSIS — Z12.4 SCREENING FOR CERVICAL CANCER: ICD-10-CM

## 2025-06-19 DIAGNOSIS — R07.9 CHEST PAIN, UNSPECIFIED TYPE: Primary | ICD-10-CM

## 2025-06-19 DIAGNOSIS — E78.00 HYPERCHOLESTEROLEMIA: ICD-10-CM

## 2025-06-19 DIAGNOSIS — M25.562 CHRONIC PAIN OF BOTH KNEES: ICD-10-CM

## 2025-06-19 DIAGNOSIS — G89.29 CHRONIC PAIN OF BOTH KNEES: ICD-10-CM

## 2025-06-19 DIAGNOSIS — M25.561 CHRONIC PAIN OF BOTH KNEES: ICD-10-CM

## 2025-06-19 ASSESSMENT — ENCOUNTER SYMPTOMS
DIARRHEA: 0
VOMITING: 0
CONSTIPATION: 0
COLOR CHANGE: 0
SORE THROAT: 0
CHEST TIGHTNESS: 0
SHORTNESS OF BREATH: 0
ABDOMINAL PAIN: 0

## 2025-06-19 NOTE — PROGRESS NOTES
Subjective:      Chief Complaint   Patient presents with    Annual Exam     Heart issue-pain has only had a few times  Both knees sore, more on L side-1 month, went to walk in and got cortisone shot that did not help   Referral to a new ob/gyn-since melissa is retiring        HPI:  Miladys Li is a 74 y.o. female who presents today for follow up of chronic conditions as listed below.    Patient was referred for stress test at last appointment which she had completed last fall- was negative.  States she has had a few more episodes of chest pain since her last appointment (both a few months ago). Usually happens while she is in bed, has some radiation into her jaws but eventually resolves on its own.      States she is concerned she is still having symptoms.  Had a cardiac catheterization 10 years ago.     Also saw ortho 3 months ago and had an injection.  States it did not help but then tried a roller which resolved her symptoms until a few weeks ago when she was on her knees gardening.  Has been having recurrence of pain since then- was initially unbearable but has improved a little.  Started PT a few weeks ago when pain recurred, feels it has been helping somewhat but still having pain.  Asymptomatic at rest, but hurts when she gets up from sitting.      Was told to follow up with ortho in 6 weeks but did not as her symptoms had resolved.      Is also requesting referral to Dr. Rebollar as her previous provider retired.  States she does still get pap smears (had one last year) and was told to repeat in 2 years.  Is not certain, but believes results were normal and is not aware of history of abnormal pap smear results.      States she is due for mammogram and is usually notified of dense breast tissue requiring follow up US.     Got labs completed last week, but only TSH was drawn.          Past Medical History:   Diagnosis Date    Coronary artery spasm     negative cardiac cath 2015    Kidney stone     Parkinson

## 2025-06-23 ENCOUNTER — TELEPHONE (OUTPATIENT)
Dept: INTERNAL MEDICINE CLINIC | Age: 75
End: 2025-06-23

## 2025-07-08 ENCOUNTER — INITIAL CONSULT (OUTPATIENT)
Dept: CARDIOLOGY CLINIC | Age: 75
End: 2025-07-08
Payer: MEDICARE

## 2025-07-08 VITALS
SYSTOLIC BLOOD PRESSURE: 132 MMHG | DIASTOLIC BLOOD PRESSURE: 86 MMHG | WEIGHT: 138 LBS | HEIGHT: 67 IN | BODY MASS INDEX: 21.66 KG/M2 | HEART RATE: 68 BPM

## 2025-07-08 DIAGNOSIS — R07.2 PRECORDIAL PAIN: ICD-10-CM

## 2025-07-08 DIAGNOSIS — R07.9 CHEST PAIN, UNSPECIFIED TYPE: Primary | ICD-10-CM

## 2025-07-08 DIAGNOSIS — G20.B1 PARKINSON'S DISEASE WITH DYSKINESIA, UNSPECIFIED WHETHER MANIFESTATIONS FLUCTUATE (HCC): ICD-10-CM

## 2025-07-08 DIAGNOSIS — R06.02 SHORTNESS OF BREATH: ICD-10-CM

## 2025-07-08 PROCEDURE — 1159F MED LIST DOCD IN RCRD: CPT | Performed by: INTERNAL MEDICINE

## 2025-07-08 PROCEDURE — 3017F COLORECTAL CA SCREEN DOC REV: CPT | Performed by: INTERNAL MEDICINE

## 2025-07-08 PROCEDURE — G8420 CALC BMI NORM PARAMETERS: HCPCS | Performed by: INTERNAL MEDICINE

## 2025-07-08 PROCEDURE — 1090F PRES/ABSN URINE INCON ASSESS: CPT | Performed by: INTERNAL MEDICINE

## 2025-07-08 PROCEDURE — 1036F TOBACCO NON-USER: CPT | Performed by: INTERNAL MEDICINE

## 2025-07-08 PROCEDURE — 93000 ELECTROCARDIOGRAM COMPLETE: CPT | Performed by: INTERNAL MEDICINE

## 2025-07-08 PROCEDURE — 1123F ACP DISCUSS/DSCN MKR DOCD: CPT | Performed by: INTERNAL MEDICINE

## 2025-07-08 PROCEDURE — G8399 PT W/DXA RESULTS DOCUMENT: HCPCS | Performed by: INTERNAL MEDICINE

## 2025-07-08 PROCEDURE — 99204 OFFICE O/P NEW MOD 45 MIN: CPT | Performed by: INTERNAL MEDICINE

## 2025-07-08 PROCEDURE — G8427 DOCREV CUR MEDS BY ELIG CLIN: HCPCS | Performed by: INTERNAL MEDICINE

## 2025-07-08 RX ORDER — AMLODIPINE BESYLATE 2.5 MG/1
2.5 TABLET ORAL DAILY
Qty: 90 TABLET | Refills: 2 | Status: SHIPPED | OUTPATIENT
Start: 2025-07-08

## 2025-07-08 RX ORDER — NITROGLYCERIN 0.4 MG/1
0.4 TABLET SUBLINGUAL PRN
Qty: 25 TABLET | Refills: 3 | Status: SHIPPED | OUTPATIENT
Start: 2025-07-08

## 2025-07-08 NOTE — PROGRESS NOTES
CLINICAL STAFF DOCUMENTATION    Dr. Asif Li  1950  [id number]    Have you had any Chest Pain recently? - Yes  If Yes DO EKG   When did the pain begin? - Years 10 years   What type of pain is it? - Sharp Pain   Tender to palpate (touch)? No  Does the pain radiate or stay in one place? - Yes  How long does the pain last? - 10 minutes    How Severe is the pain? - 4  Is there anything that aggravates or triggers the pain?   Did you take any medication?    And did it relieve the pain -   Is there anything that relieves it?   Do you have any other symptoms at the same time?     DO EKG IF: Patient has a Heart Rate above 100 or below 50 (Ex:A-fib, Aflutter, PAF, SVT, VT, Bradycardia)      CAD (Coronary Artery Disease) patient should have one on file every 6 months     New Consult or New Patient     If patient is following up from a current Stent/PCI     If patient is following up from a current Cardioversion        Have you had any Shortness of Breath - Yes  When did it begin? - Years   If Yes - When on exertion  How many flights of stairs can you go up without having SOB? -   Are you on Oxygen during the day or at night? - No  How many liters of Oxygen are you on? -   How many pillows do you sleep with under your head? - 1    Have you had any dizziness - No      Have you had any palpitations recently? - No      Do you have any edema - swelling in No        Is the patient on any of the following medications -   If Yes DO EKG - Needs done every 3 months    When did you have your last labs drawn 6/25  What doctor ordered vivian   Do we have the labs in their chart   If we do not have the labs, ask where they were drawn     If we do not have these labs, you are retrieve these labs for the provider!    Do you need any prescriptions refilled? -     Do you have a surgery or procedure scheduled in the near future - No      Do use tobacco products? - No  Do you drink alcohol? - Yes  Do you use 
     b complex vitamins capsule Take 1 capsule by mouth daily      Calcium Acetate, Phos Binder, (CALCIUM ACETATE PO) Take 1 tablet by mouth Daily with supper      coenzyme Q10 100 MG CAPS capsule Take 1 capsule by mouth daily      magnesium gluconate (MAGONATE) 500 MG tablet Take 1 tablet by mouth 2 times daily      Menaquinone-7 (VITAMIN K2 PO) Take by mouth daily      Cod Liver Oil 10 MINIM CAPS 1 tablet by NOT APPLICABLE route daily Indications: pt reports taking liquid      Cholecalciferol (VITAMIN D3) 125 MCG (5000 UT) TABS Take 1 tablet by mouth every other day 30 tablet 11    carbidopa-levodopa (SINEMET)  MG per tablet 1 tablet 3 times daily 90 tablet 3     No current facility-administered medications for this visit.       Allergies:   Haemophilus influenzae vaccines    Patient History:  Past Medical History:   Diagnosis Date    Coronary artery spasm     negative cardiac cath 2015    Kidney stone     Parkinson disease (HCC)     2020, following with neuro@[facility]    Thyroid nodule      Past Surgical History:   Procedure Laterality Date    BREAST BIOPSY      CATARACT REMOVAL Bilateral 09/2020    EYE MUSCLE SURGERY      childhood     Family History   Problem Relation Age of Onset    Uterine Cancer Mother     Cancer Mother     Cancer Father         leukemia    Coronary Art Dis Father     Other Sister         heart spasms    Heart Disease Sister     Heart Disease Sister     Other Sister         heart spasms    Cancer Sister         melanoma    Heart Disease Sister         stent    Heart Attack Sister     Other Sister         heart spasms    Seizures Brother     Epilepsy Brother     No Known Problems Brother      Social History     Tobacco Use    Smoking status: Never    Smokeless tobacco: Never   Substance Use Topics    Alcohol use: Yes     Alcohol/week: 2.0 standard drinks of alcohol     Types: 2 Glasses of wine per week     Comment: 2 glasses a week        Review of Systems:   Constitutional: No Fever or

## 2025-07-08 NOTE — PATIENT INSTRUCTIONS
Thank you for allowing us to care for you today!   We want to ensure we can follow your treatment plan and we strive to give you the best outcomes and experience possible.   If you ever have a life threatening emergency and call 911 - for an ambulance (EMS)  REMEMBER  Our providers can only care for you at:   [facility] or [facility]   Even if you have someone take you or you drive yourself we can only care for you in a [facility] facility. Our providers are not setup at the other healthcare locations!    PLEASE CALL OUR OFFICE DURING NORMAL BUSINESS HOURS  Monday through Friday 8 am to 5 pm  AFTER HOURS the physician on-call cannot help with scheduling, rescheduling, procedure instruction questions or any type of medication need or issue.  [facility] P:[phone] - [facility] P:[phone] - [facility] P:[phone]      If you receive a survey:  We would appreciate you taking the time to share your experience concerning your provider visit in the office.    These surveys are confidential!  We are eager to improve and are counting on you to share your feedback so we can ensure you get the best care possible.

## 2025-07-21 ENCOUNTER — TELEPHONE (OUTPATIENT)
Dept: INTERNAL MEDICINE CLINIC | Age: 75
End: 2025-07-21

## 2025-07-21 DIAGNOSIS — Z12.31 ENCOUNTER FOR SCREENING MAMMOGRAM FOR MALIGNANT NEOPLASM OF BREAST: ICD-10-CM

## 2025-07-29 ENCOUNTER — TELEPHONE (OUTPATIENT)
Dept: CARDIOLOGY CLINIC | Age: 75
End: 2025-07-29